# Patient Record
Sex: FEMALE | Race: WHITE | NOT HISPANIC OR LATINO
[De-identification: names, ages, dates, MRNs, and addresses within clinical notes are randomized per-mention and may not be internally consistent; named-entity substitution may affect disease eponyms.]

---

## 2018-12-31 ENCOUNTER — INBOUND DOCUMENT (OUTPATIENT)
Age: 78
End: 2018-12-31

## 2019-02-15 ENCOUNTER — RECORD ABSTRACTING (OUTPATIENT)
Age: 79
End: 2019-02-15

## 2019-02-15 DIAGNOSIS — D50.0 IRON DEFICIENCY ANEMIA SECONDARY TO BLOOD LOSS (CHRONIC): ICD-10-CM

## 2019-02-15 DIAGNOSIS — Z98.890 OTHER SPECIFIED POSTPROCEDURAL STATES: ICD-10-CM

## 2019-02-15 DIAGNOSIS — Z85.118 PERSONAL HISTORY OF OTHER MALIGNANT NEOPLASM OF BRONCHUS AND LUNG: ICD-10-CM

## 2019-02-15 DIAGNOSIS — N83.9 NONINFLAMMATORY DISORDER OF OVARY, FALLOPIAN TUBE AND BROAD LIGAMENT, UNSPECIFIED: ICD-10-CM

## 2019-02-15 DIAGNOSIS — K27.9 PEPTIC ULCER, SITE UNSPECIFIED, UNSPECIFIED AS ACUTE OR CHRONIC, W/OUT HEMORRHAGE OR PERFORATION: ICD-10-CM

## 2019-02-15 DIAGNOSIS — Z87.891 PERSONAL HISTORY OF NICOTINE DEPENDENCE: ICD-10-CM

## 2019-02-18 ENCOUNTER — APPOINTMENT (OUTPATIENT)
Dept: INTERNAL MEDICINE | Facility: CLINIC | Age: 79
End: 2019-02-18
Payer: MEDICARE

## 2019-02-18 VITALS
HEIGHT: 65 IN | SYSTOLIC BLOOD PRESSURE: 138 MMHG | WEIGHT: 128 LBS | DIASTOLIC BLOOD PRESSURE: 72 MMHG | BODY MASS INDEX: 21.33 KG/M2

## 2019-02-18 DIAGNOSIS — Z85.118 PERSONAL HISTORY OF OTHER MALIGNANT NEOPLASM OF BRONCHUS AND LUNG: ICD-10-CM

## 2019-02-18 DIAGNOSIS — F10.10 ALCOHOL ABUSE, UNCOMPLICATED: ICD-10-CM

## 2019-02-18 PROCEDURE — 99214 OFFICE O/P EST MOD 30 MIN: CPT | Mod: 25

## 2019-02-18 PROCEDURE — 36415 COLL VENOUS BLD VENIPUNCTURE: CPT

## 2019-02-18 RX ORDER — CLOPIDOGREL BISULFATE 75 MG/1
75 TABLET, FILM COATED ORAL
Refills: 0 | Status: DISCONTINUED | COMMUNITY
End: 2019-02-18

## 2019-02-18 RX ORDER — NUT.TX.IMPAIRED DIGESTIVE FXN 0.035-1/ML
LIQUID (ML) ORAL
Refills: 0 | Status: DISCONTINUED | COMMUNITY
End: 2019-02-18

## 2019-02-19 PROBLEM — F10.10 ALCOHOL ABUSE: Status: ACTIVE | Noted: 2019-02-19

## 2019-02-19 PROBLEM — Z85.118 HISTORY OF MALIGNANT NEOPLASM OF LUNG: Status: RESOLVED | Noted: 2019-02-18 | Resolved: 2019-02-19

## 2019-02-19 LAB
ALBUMIN SERPL ELPH-MCNC: 4.2 G/DL
ALP BLD-CCNC: 79 U/L
ALT SERPL-CCNC: 10 U/L
ANION GAP SERPL CALC-SCNC: 13 MMOL/L
AST SERPL-CCNC: 21 U/L
BASOPHILS # BLD AUTO: 0.04 K/UL
BASOPHILS NFR BLD AUTO: 0.5 %
BILIRUB SERPL-MCNC: 0.2 MG/DL
BUN SERPL-MCNC: 21 MG/DL
CALCIUM SERPL-MCNC: 9.9 MG/DL
CHLORIDE SERPL-SCNC: 100 MMOL/L
CHOLEST SERPL-MCNC: 187 MG/DL
CHOLEST/HDLC SERPL: 2.4 RATIO
CO2 SERPL-SCNC: 25 MMOL/L
CREAT SERPL-MCNC: 0.61 MG/DL
EOSINOPHIL # BLD AUTO: 0.11 K/UL
EOSINOPHIL NFR BLD AUTO: 1.4 %
FOLATE SERPL-MCNC: >20 NG/ML
GLUCOSE SERPL-MCNC: 106 MG/DL
HCT VFR BLD CALC: 41.7 %
HDLC SERPL-MCNC: 77 MG/DL
HGB BLD-MCNC: 13.4 G/DL
IMM GRANULOCYTES NFR BLD AUTO: 0.3 %
LDLC SERPL CALC-MCNC: 92 MG/DL
LYMPHOCYTES # BLD AUTO: 1.57 K/UL
LYMPHOCYTES NFR BLD AUTO: 20.2 %
MAN DIFF?: NORMAL
MCHC RBC-ENTMCNC: 32.1 GM/DL
MCHC RBC-ENTMCNC: 32.4 PG
MCV RBC AUTO: 101 FL
MONOCYTES # BLD AUTO: 0.61 K/UL
MONOCYTES NFR BLD AUTO: 7.9 %
NEUTROPHILS # BLD AUTO: 5.42 K/UL
NEUTROPHILS NFR BLD AUTO: 69.7 %
PLATELET # BLD AUTO: 373 K/UL
POTASSIUM SERPL-SCNC: 5 MMOL/L
PROT SERPL-MCNC: 7.5 G/DL
RBC # BLD: 4.13 M/UL
RBC # FLD: 13.3 %
SODIUM SERPL-SCNC: 138 MMOL/L
T4 FREE SERPL-MCNC: 2.1 NG/DL
TRIGL SERPL-MCNC: 88 MG/DL
TSH SERPL-ACNC: 2.27 UIU/ML
VIT B12 SERPL-MCNC: 796 PG/ML
WBC # FLD AUTO: 7.77 K/UL

## 2019-02-19 NOTE — REVIEW OF SYSTEMS
[Joint Stiffness] : joint stiffness [Anxiety] : anxiety [Negative] : Heme/Lymph [Suicidal] : not suicidal [FreeTextEntry9] : s/p left shoulder fx & surgery

## 2019-02-19 NOTE — PHYSICAL EXAM

## 2019-02-19 NOTE — PLAN
[FreeTextEntry1] : 80 y/o right handed female presents with daughter Deepa for f/u, s/p left shoulder Fx and surgical repair following fall Christmas Day. Using cane due to unsteadiness. Moving back to the area from [place]. Labs done today. Advised pt to finish PT for shoulder and we will then consider driving eval. Call next week for results

## 2019-02-19 NOTE — HISTORY OF PRESENT ILLNESS
[FreeTextEntry1] : Followup since fall resulting in fractured left shoulder Luis day [de-identified] : 79-year-old right-handed female with h/o alcohol abuse, who fell Luis Day at her son's house fracturing her left shoulder presents with her daughter Chiquita. Pt returned to [place] to live last year, was visiting for [place]. Surgery was performed by Dr. Moulton of [facility] orthopedics at [facility]. Since then it has been decided that she will permanently move from [place] to the area, signs least at [place] tomorrow, as she is alone in [place] since her  . She is attending physical therapy for her shoulder regularly, currently is using cane but states she is using it because her children want her to although she does say she sometimes feels unsteady. Pt wants to know when she can start driving again - advised she must pass driving eval with PT @ [facility] before being cleared by me. I will refer her when she is done with PT for her shoulder. Has history of alcohol abuse, GI bleed, stent\par Denies chest pain/sob

## 2019-02-20 LAB
25(OH)D3 SERPL-MCNC: 59.5 NG/ML
TTG IGA SER IA-ACNC: <1.2 U/ML
TTG IGA SER-ACNC: NEGATIVE
TTG IGG SER IA-ACNC: 1.4 U/ML
TTG IGG SER IA-ACNC: NEGATIVE

## 2019-03-08 DIAGNOSIS — Z13.820 ENCOUNTER FOR SCREENING FOR OSTEOPOROSIS: ICD-10-CM

## 2019-03-13 PROBLEM — Z13.820 ENCOUNTER FOR SCREENING FOR OSTEOPOROSIS: Status: ACTIVE | Noted: 2019-03-13

## 2019-03-15 ENCOUNTER — RESULT REVIEW (OUTPATIENT)
Age: 79
End: 2019-03-15

## 2019-04-10 ENCOUNTER — APPOINTMENT (OUTPATIENT)
Dept: CARDIOLOGY | Facility: CLINIC | Age: 79
End: 2019-04-10

## 2019-05-15 RX ORDER — LOSARTAN POTASSIUM 100 MG/1
TABLET, FILM COATED ORAL
Refills: 0 | Status: DISCONTINUED | COMMUNITY
End: 2019-05-15

## 2019-06-06 ENCOUNTER — RX RENEWAL (OUTPATIENT)
Age: 79
End: 2019-06-06

## 2019-07-24 ENCOUNTER — LABORATORY RESULT (OUTPATIENT)
Age: 79
End: 2019-07-24

## 2019-07-24 ENCOUNTER — APPOINTMENT (OUTPATIENT)
Dept: INTERNAL MEDICINE | Facility: CLINIC | Age: 79
End: 2019-07-24
Payer: MEDICARE

## 2019-07-24 VITALS
HEIGHT: 65 IN | SYSTOLIC BLOOD PRESSURE: 130 MMHG | WEIGHT: 134 LBS | BODY MASS INDEX: 22.33 KG/M2 | DIASTOLIC BLOOD PRESSURE: 76 MMHG

## 2019-07-24 DIAGNOSIS — J30.89 OTHER ALLERGIC RHINITIS: ICD-10-CM

## 2019-07-24 DIAGNOSIS — J30.2 OTHER ALLERGIC RHINITIS: ICD-10-CM

## 2019-07-24 PROCEDURE — 93000 ELECTROCARDIOGRAM COMPLETE: CPT

## 2019-07-24 PROCEDURE — 36415 COLL VENOUS BLD VENIPUNCTURE: CPT

## 2019-07-24 PROCEDURE — 99214 OFFICE O/P EST MOD 30 MIN: CPT | Mod: 25

## 2019-07-25 LAB
25(OH)D3 SERPL-MCNC: 76.8 NG/ML
ALBUMIN SERPL ELPH-MCNC: 4.7 G/DL
ALP BLD-CCNC: 72 U/L
ALT SERPL-CCNC: 17 U/L
ANION GAP SERPL CALC-SCNC: 14 MMOL/L
APPEARANCE: CLEAR
AST SERPL-CCNC: 22 U/L
BASOPHILS # BLD AUTO: 0.07 K/UL
BASOPHILS NFR BLD AUTO: 1.3 %
BILIRUB SERPL-MCNC: 0.6 MG/DL
BILIRUBIN URINE: NEGATIVE
BLOOD URINE: NEGATIVE
BUN SERPL-MCNC: 17 MG/DL
CALCIUM SERPL-MCNC: 10.4 MG/DL
CHLORIDE SERPL-SCNC: 101 MMOL/L
CHOLEST SERPL-MCNC: 254 MG/DL
CHOLEST/HDLC SERPL: 1.8 RATIO
CO2 SERPL-SCNC: 29 MMOL/L
COLOR: YELLOW
CREAT SERPL-MCNC: 0.8 MG/DL
EOSINOPHIL # BLD AUTO: 0.09 K/UL
EOSINOPHIL NFR BLD AUTO: 1.6 %
FOLATE SERPL-MCNC: >20 NG/ML
GLUCOSE QUALITATIVE U: NEGATIVE
GLUCOSE SERPL-MCNC: 142 MG/DL
HCT VFR BLD CALC: 43.6 %
HDLC SERPL-MCNC: 143 MG/DL
HGB BLD-MCNC: 13.7 G/DL
IMM GRANULOCYTES NFR BLD AUTO: 0.2 %
KETONES URINE: NEGATIVE
LDLC SERPL CALC-MCNC: 97 MG/DL
LEUKOCYTE ESTERASE URINE: NEGATIVE
LYMPHOCYTES # BLD AUTO: 1 K/UL
LYMPHOCYTES NFR BLD AUTO: 18.2 %
MAN DIFF?: NORMAL
MCHC RBC-ENTMCNC: 31.4 GM/DL
MCHC RBC-ENTMCNC: 34 PG
MCV RBC AUTO: 108.2 FL
MONOCYTES # BLD AUTO: 0.35 K/UL
MONOCYTES NFR BLD AUTO: 6.4 %
NEUTROPHILS # BLD AUTO: 3.96 K/UL
NEUTROPHILS NFR BLD AUTO: 72.3 %
NITRITE URINE: NEGATIVE
PH URINE: 5.5
PLATELET # BLD AUTO: 236 K/UL
POTASSIUM SERPL-SCNC: 4.7 MMOL/L
PROT SERPL-MCNC: 7.6 G/DL
PROTEIN URINE: NEGATIVE
RBC # BLD: 4.03 M/UL
RBC # FLD: 14.1 %
SODIUM SERPL-SCNC: 144 MMOL/L
SPECIFIC GRAVITY URINE: 1.02
T4 FREE SERPL-MCNC: 2.1 NG/DL
TRIGL SERPL-MCNC: 72 MG/DL
TSH SERPL-ACNC: 1.26 UIU/ML
UROBILINOGEN URINE: NORMAL
VIT B12 SERPL-MCNC: 582 PG/ML
WBC # FLD AUTO: 5.48 K/UL

## 2019-07-26 ENCOUNTER — NON-APPOINTMENT (OUTPATIENT)
Age: 79
End: 2019-07-26

## 2019-07-26 RX ORDER — FERROUS SULFATE 325(65) MG
325 TABLET ORAL
Refills: 0 | Status: ACTIVE | COMMUNITY

## 2019-07-26 NOTE — HISTORY OF PRESENT ILLNESS
[FreeTextEntry1] : Annual exam [de-identified] : 79-year-old female presents for annual exam, bone density done in March of this year shows osteoporosis at hip, osteopenia lumbar spine.\par \par Also complaining of constant runny nose, clear fluid.\par \par Feels well, appetite good, walking as regularly as possible. Denies chest pain shortness of breath palpitations dizziness\par \par Up to date with screenings

## 2019-07-26 NOTE — PLAN
[FreeTextEntry1] : 79-year-old female presents for annual exam. 3/19 bone density shows osteoporosis of hip, she will start Actonel weekly. She understands how to take it\par \par She is also complaining about a persistent runny nose, clear fluid. Prescription sent for Azelastine nasal spray\par She will contact medical provider in [place] to obtain records i.e. colonoscopy mammogram etc.\par Call next week to review labs

## 2019-07-26 NOTE — COUNSELING
[Healthy eating counseling provided] : healthy eating [Activity counseling provided] : activity [Good understanding] : Patient has a good understanding of disease, goals and obesity follow-up plan [Fall prevention counseling provided] : fall prevention

## 2019-07-26 NOTE — HEALTH RISK ASSESSMENT
[Good] : ~his/her~  mood as  good [No] : No [No falls in past year] : Patient reported no falls in the past year [0] : 2) Feeling down, depressed, or hopeless: Not at all (0) [Patient reported bone density results were abnormal] : Patient reported bone density results were abnormal [HIV test declined] : HIV test declined [Hepatitis C test declined] : Hepatitis C test declined [Patient reported mammogram was normal] : Patient reported mammogram was normal [Patient reported PAP Smear was normal] : Patient reported PAP Smear was normal [Patient reported colonoscopy was normal] : Patient reported colonoscopy was normal [] : No [[id number]] : 0 [PapSmearComments] : as per patient, records will be transferred from [place] [MammogramComments] : as per patient, records will be transferred from [place] [BoneDensityDate] : 03/2019 [ColonoscopyComments] : as per patient, records will be transferred from [place] [BoneDensityComments] : OSTEOPOROSIS

## 2019-07-26 NOTE — PHYSICAL EXAM
[Normal Female:] : bladder was normal on palpation [Normal] : normal gait, coordination grossly intact, no focal deficits [de-identified] : Deferred GYN, denies pain lumps discharge [de-identified] : No lymphadenopathy [FreeTextEntry1] : Deferred GYN [de-identified] : Denies excessive thirst, urination, fatigue [de-identified] : Alert and oriented x3, no thought disorder [de-identified] : No rash, skinl esion [de-identified] : Bilateral hand tremor

## 2019-09-13 ENCOUNTER — RX RENEWAL (OUTPATIENT)
Age: 79
End: 2019-09-13

## 2019-09-19 ENCOUNTER — RX RENEWAL (OUTPATIENT)
Age: 79
End: 2019-09-19

## 2019-10-22 ENCOUNTER — RX RENEWAL (OUTPATIENT)
Age: 79
End: 2019-10-22

## 2019-12-06 ENCOUNTER — RX RENEWAL (OUTPATIENT)
Age: 79
End: 2019-12-06

## 2020-03-27 ENCOUNTER — RX RENEWAL (OUTPATIENT)
Age: 80
End: 2020-03-27

## 2020-04-02 PROBLEM — K27.9 GASTRODUODENAL ULCER: Status: ACTIVE | Noted: 2019-02-15

## 2020-04-09 ENCOUNTER — RX RENEWAL (OUTPATIENT)
Age: 80
End: 2020-04-09

## 2020-06-04 ENCOUNTER — RX RENEWAL (OUTPATIENT)
Age: 80
End: 2020-06-04

## 2020-06-07 ENCOUNTER — RX RENEWAL (OUTPATIENT)
Age: 80
End: 2020-06-07

## 2020-07-01 ENCOUNTER — NON-APPOINTMENT (OUTPATIENT)
Age: 80
End: 2020-07-01

## 2020-07-01 ENCOUNTER — LABORATORY RESULT (OUTPATIENT)
Age: 80
End: 2020-07-01

## 2020-07-01 ENCOUNTER — APPOINTMENT (OUTPATIENT)
Dept: INTERNAL MEDICINE | Facility: CLINIC | Age: 80
End: 2020-07-01
Payer: MEDICARE

## 2020-07-01 VITALS
DIASTOLIC BLOOD PRESSURE: 90 MMHG | HEIGHT: 65 IN | WEIGHT: 136 LBS | TEMPERATURE: 99.1 F | SYSTOLIC BLOOD PRESSURE: 136 MMHG | BODY MASS INDEX: 22.66 KG/M2

## 2020-07-01 VITALS
TEMPERATURE: 99.1 F | DIASTOLIC BLOOD PRESSURE: 90 MMHG | HEIGHT: 65 IN | BODY MASS INDEX: 22.66 KG/M2 | SYSTOLIC BLOOD PRESSURE: 136 MMHG | WEIGHT: 136 LBS

## 2020-07-01 DIAGNOSIS — Z20.828 CONTACT WITH AND (SUSPECTED) EXPOSURE TO OTHER VIRAL COMMUNICABLE DISEASES: ICD-10-CM

## 2020-07-01 PROCEDURE — 93000 ELECTROCARDIOGRAM COMPLETE: CPT

## 2020-07-01 PROCEDURE — 99214 OFFICE O/P EST MOD 30 MIN: CPT | Mod: CS,25

## 2020-07-01 PROCEDURE — 36415 COLL VENOUS BLD VENIPUNCTURE: CPT | Mod: CS

## 2020-07-01 RX ORDER — LEVOTHYROXINE SODIUM 0.07 MG/1
75 TABLET ORAL
Qty: 90 | Refills: 1 | Status: DISCONTINUED | COMMUNITY
Start: 2019-03-08 | End: 2020-07-01

## 2020-07-01 NOTE — HISTORY OF PRESENT ILLNESS
[Coronary Artery Disease] : coronary artery disease [COPD] : COPD [No Adverse Anesthesia Reaction] : no adverse anesthesia reaction in self or family member [(Patient denies any chest pain, claudication, dyspnea on exertion, orthopnea, palpitations or syncope)] : Patient denies any chest pain, claudication, dyspnea on exertion, orthopnea, palpitations or syncope [Aortic Stenosis] : no aortic stenosis [Recent Myocardial Infarction] : no recent myocardial infarction [Atrial Fibrillation] : no atrial fibrillation [Sleep Apnea] : no sleep apnea [Implantable Device/Pacemaker] : no implantable device/pacemaker [Asthma] : no asthma [Smoker] : not a smoker [Chronic Anticoagulation] : no chronic anticoagulation [Diabetes] : no diabetes [Chronic Kidney Disease] : no chronic kidney disease [FreeTextEntry1] : Vitrectomy RIGHT Eye [FreeTextEntry2] : 7/23/20 [FreeTextEntry3] : Dr Kris Alberts [FreeTextEntry4] : 80-year-old female with macular degeneration, seeing black floaters that look like bugs, scheduled for RIGHT eye vitrectomy. Patient denies chest pain shortness of breath palpitations dizziness.She is currently free from infectious disease [FreeTextEntry5] : 1/17 - non Q-wave MI (minimal troponin elevation, likely due to "demand ischemia" in setting of severe influenza 2/17 - pneumonia, intubated, sepsis, 2nd non Q-wave MI, Echo-EF >55%, 4/17- PTCA     2013 - Lung Ca, Right upper lobe lobectomy

## 2020-07-01 NOTE — REVIEW OF SYSTEMS
[Redness] : redness [Vision Problems] : vision problems [Negative] : Heme/Lymph [Discharge] : no discharge [Itching] : no itching [Pain] : no pain

## 2020-07-01 NOTE — ASSESSMENT
[Patient Optimized for Surgery] : Patient optimized for surgery [No Further Testing Recommended] : no further testing recommended [As per surgery] : as per surgery [FreeTextEntry4] : MEDICALLY CLEARED FOR PROPOSED SURGERY; CARDIAC CLEARANCE AS PER DR LUIS HOUSE

## 2020-07-01 NOTE — PLAN
[FreeTextEntry1] : 80-year-old female scheduled for right eye vitrectomy due to macular degeneration. Denies chest pain shortness of breath palpitations dizziness. Patient currently free from infectious disease

## 2020-07-01 NOTE — PHYSICAL EXAM
[Normal] : no joint swelling, grossly normal strength/tone [Normal Female:] : bladder was normal on palpation [de-identified] : Deferred GYN; Denies pain, lumps and discharge [FreeTextEntry1] : Deferred GI/GYN [de-identified] : No lymphadenopathy [de-identified] : Denies excessive thirst, urination, fatigue [de-identified] : Essential tremor, gait dysfunction [de-identified] : No rash or skin lesion [de-identified] : Alert and Oriented x3.  Appropriate mood and affect

## 2020-07-02 ENCOUNTER — APPOINTMENT (OUTPATIENT)
Dept: CARDIOLOGY | Facility: CLINIC | Age: 80
End: 2020-07-02
Payer: MEDICARE

## 2020-07-02 VITALS
SYSTOLIC BLOOD PRESSURE: 128 MMHG | BODY MASS INDEX: 22.66 KG/M2 | DIASTOLIC BLOOD PRESSURE: 82 MMHG | HEIGHT: 65 IN | WEIGHT: 136 LBS

## 2020-07-02 DIAGNOSIS — Z86.79 PERSONAL HISTORY OF OTHER DISEASES OF THE CIRCULATORY SYSTEM: ICD-10-CM

## 2020-07-02 DIAGNOSIS — Z01.818 ENCOUNTER FOR OTHER PREPROCEDURAL EXAMINATION: ICD-10-CM

## 2020-07-02 LAB
ALBUMIN SERPL ELPH-MCNC: 4.6 G/DL
ALP BLD-CCNC: 79 U/L
ALT SERPL-CCNC: 12 U/L
ANION GAP SERPL CALC-SCNC: 14 MMOL/L
APTT BLD: 31.2 SEC
AST SERPL-CCNC: 20 U/L
BASOPHILS # BLD AUTO: 0.06 K/UL
BASOPHILS NFR BLD AUTO: 1.6 %
BILIRUB SERPL-MCNC: 0.6 MG/DL
BUN SERPL-MCNC: 16 MG/DL
CALCIUM SERPL-MCNC: 9.7 MG/DL
CHLORIDE SERPL-SCNC: 98 MMOL/L
CHOLEST SERPL-MCNC: 249 MG/DL
CHOLEST/HDLC SERPL: 2.1 RATIO
CO2 SERPL-SCNC: 28 MMOL/L
CREAT SERPL-MCNC: 0.82 MG/DL
EOSINOPHIL # BLD AUTO: 0.12 K/UL
EOSINOPHIL NFR BLD AUTO: 3.1 %
GLUCOSE SERPL-MCNC: 166 MG/DL
HCT VFR BLD CALC: 45.2 %
HDLC SERPL-MCNC: 116 MG/DL
HGB BLD-MCNC: 14.8 G/DL
IMM GRANULOCYTES NFR BLD AUTO: 0.3 %
INR PPP: 0.91 RATIO
LDLC SERPL CALC-MCNC: 115 MG/DL
LYMPHOCYTES # BLD AUTO: 1.19 K/UL
LYMPHOCYTES NFR BLD AUTO: 31.2 %
MAN DIFF?: NORMAL
MCHC RBC-ENTMCNC: 32.7 GM/DL
MCHC RBC-ENTMCNC: 32.7 PG
MCV RBC AUTO: 99.8 FL
MONOCYTES # BLD AUTO: 0.28 K/UL
MONOCYTES NFR BLD AUTO: 7.3 %
NEUTROPHILS # BLD AUTO: 2.16 K/UL
NEUTROPHILS NFR BLD AUTO: 56.5 %
PLATELET # BLD AUTO: 235 K/UL
POTASSIUM SERPL-SCNC: 5.1 MMOL/L
PROT SERPL-MCNC: 7.5 G/DL
PT BLD: 10.8 SEC
RBC # BLD: 4.53 M/UL
RBC # FLD: 13.6 %
SODIUM SERPL-SCNC: 140 MMOL/L
TRIGL SERPL-MCNC: 85 MG/DL
WBC # FLD AUTO: 3.82 K/UL

## 2020-07-02 PROCEDURE — 99214 OFFICE O/P EST MOD 30 MIN: CPT

## 2020-07-02 NOTE — HISTORY OF PRESENT ILLNESS
[FreeTextEntry1] : 79 yo woman, seen preop before right eye vitrectomy, with multiple medical issues, including:\par 1) Hx of lung cancer--\par 2) essential hypertension, on losartan and metoprolol tartrate 25\par 3) Hypothyroidism on levothyroxine\par 4) COPD\par 5) Hx of CAD\par \par The patient raised her family here in [facility], but then came back to the area when her  passed away. She lives locally and has grown children and his sister.\par \par In the past she was hospitalized for pneumonia and sepsis. She was found to have lung cancer and underwent successful lobectomy. The surgery was felt to be curative. In 2017 she had a non-Q-wave MI. 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 She is scheduled for right vitrectomy on July 23.\par \par The patient is asymptomatic.\par She does not experience angina at all. No shortness of breath or dyspnea on exertion.\par No history of congestive heart failure. No orthopnea or PND. No peripheral edema.\par She denies knowledge of a heart murmur or rheumatic fever.\par \par The patient smoked as a young adult but not in the last 40+ years\par There is a history of alcohol abuse.\par The patient is , lives at [place], and has adult children that live locally.\par \par \par Laboratory studies:  July 1, 20/20----yesterday\par ECG: Normal sinus rhythm and within normal limits\par Cholesterol 249, LDL 1:15, , triglycerides 85\par Comprehensive medical profile--normal\par Complete blood count--normal\par

## 2020-07-02 NOTE — ASSESSMENT
[FreeTextEntry1] : #1) known history of CAD with prior small non-STEMI, and subsequent cardiac catheterization revealing only branch disease of the OM. There was nonobstructive disease in the main circumflex, LAD and RCA. She is status post successful percutaneous revascularization with stenting. She is asymptomatic. No recent cardiovascular events. There were no contraindication to surgery. \par #2) history of essential hypertension--reasonably controlled on single agent, losartan.\par #3) murmur compatible with mild aortic stenosis. This is asymptomatic and on a clinical basis does not yet appear significant. However her baseline echo is indicated. This does not need to postpone surgery as it is simply obtaining a baseline study. Bone\par #4) history of lung cancer, status post resection. ?? Suspect curative\par #5) marked tremor, per neurology\par #6) history of substance abuse (alcohol).\par \par Meds: Medically stable for right vitrectomy. No contraindication to this procedure. This is a low-risk procedure. The patient is low to moderate risk. Only risk factor appears to be age and fragility. \par  no recent cardiovascular symptoms or events.\par \par Plan: Proceed with surgery\par          Electively obtain echocardiogram his baseline concerning her aortic stenosis which is presumptively mild.

## 2020-07-02 NOTE — PHYSICAL EXAM
[General Appearance - Well Developed] : well developed [Normal Appearance] : normal appearance [Well Groomed] : well groomed [General Appearance - Well Nourished] : well nourished [General Appearance - In No Acute Distress] : no acute distress [Normal Conjunctiva] : the conjunctiva exhibited no abnormalities [No Deformities] : no deformities [Eyelids - No Xanthelasma] : the eyelids demonstrated no xanthelasmas [No Oral Pallor] : no oral pallor [No Oral Cyanosis] : no oral cyanosis [Normal Oral Mucosa] : normal oral mucosa [Normal Jugular Venous V Waves Present] : normal jugular venous V waves present [Normal Jugular Venous A Waves Present] : normal jugular venous A waves present [No Jugular Venous Hernandez A Waves] : no jugular venous hernandez A waves [5th Left ICS - MCL] : palpated at the 5th LICS in the midclavicular line [Normal Rate] : normal [Normal] : normal [Normal S1] : normal S1 [Rhythm Regular] : regular [Normal S2] : normal S2 [No Abnormalities] : the abdominal aorta was not enlarged and no bruit was heard [Exaggerated Use Of Accessory Muscles For Inspiration] : no accessory muscle use [Respiration, Rhythm And Depth] : normal respiratory rhythm and effort [Abdomen Tenderness] : non-tender [Abdomen Soft] : soft [Auscultation Breath Sounds / Voice Sounds] : lungs were clear to auscultation bilaterally [Abnormal Walk] : normal gait [Abdomen Mass (___ Cm)] : no abdominal mass palpated [Nail Clubbing] : no clubbing of the fingernails [Gait - Sufficient For Exercise Testing] : the gait was sufficient for exercise testing [Cyanosis, Localized] : no localized cyanosis [Petechial Hemorrhages (___cm)] : no petechial hemorrhages [] : no rash [Skin Color & Pigmentation] : normal skin color and pigmentation [No Venous Stasis] : no venous stasis [No Skin Ulcers] : no skin ulcer [Skin Lesions] : no skin lesions [No Xanthoma] : no  xanthoma was observed [Oriented To Time, Place, And Person] : oriented to person, place, and time [Affect] : the affect was normal [Mood] : the mood was normal [No Anxiety] : not feeling anxious [II] : a grade 2 [Right Femoral Bruit] : no bruit heard over the right femoral artery [Left Carotid Bruit] : no bruit heard over the left carotid [Right Carotid Bruit] : no bruit heard over the right carotid [Left Femoral Bruit] : no bruit heard over the left femoral artery

## 2020-07-03 LAB
APPEARANCE: ABNORMAL
BILIRUBIN URINE: NEGATIVE
BLOOD URINE: NEGATIVE
COLOR: YELLOW
GLUCOSE QUALITATIVE U: NEGATIVE
KETONES URINE: NEGATIVE
LEUKOCYTE ESTERASE URINE: NEGATIVE
NITRITE URINE: NEGATIVE
PH URINE: 8
PROTEIN URINE: NEGATIVE
SARS-COV-2 IGG SERPL IA-ACNC: 0.01 INDEX
SARS-COV-2 IGG SERPL QL IA: NEGATIVE
SPECIFIC GRAVITY URINE: 1.02
UROBILINOGEN URINE: NORMAL

## 2020-07-06 LAB — BACTERIA UR CULT: ABNORMAL

## 2020-07-22 ENCOUNTER — RX RENEWAL (OUTPATIENT)
Age: 80
End: 2020-07-22

## 2020-09-01 ENCOUNTER — RX RENEWAL (OUTPATIENT)
Age: 80
End: 2020-09-01

## 2020-09-02 ENCOUNTER — RX RENEWAL (OUTPATIENT)
Age: 80
End: 2020-09-02

## 2020-09-04 ENCOUNTER — RX RENEWAL (OUTPATIENT)
Age: 80
End: 2020-09-04

## 2020-09-04 PROBLEM — I25.10 ATHEROSCLEROTIC HEART DISEASE OF NATIVE CORONARY ARTERY WITHOUT ANGINA PECTORIS: Status: ACTIVE | Noted: 2020-07-02

## 2020-09-10 ENCOUNTER — APPOINTMENT (OUTPATIENT)
Dept: CARDIOLOGY | Facility: CLINIC | Age: 80
End: 2020-09-10
Payer: MEDICARE

## 2020-09-10 ENCOUNTER — NON-APPOINTMENT (OUTPATIENT)
Age: 80
End: 2020-09-10

## 2020-09-10 VITALS
HEIGHT: 65 IN | SYSTOLIC BLOOD PRESSURE: 136 MMHG | TEMPERATURE: 97.9 F | DIASTOLIC BLOOD PRESSURE: 66 MMHG | WEIGHT: 145 LBS | BODY MASS INDEX: 24.16 KG/M2

## 2020-09-10 VITALS — HEART RATE: 52 BPM

## 2020-09-10 DIAGNOSIS — I25.10 ATHEROSCLEROTIC HEART DISEASE OF NATIVE CORONARY ARTERY W/OUT ANGINA PECTORIS: ICD-10-CM

## 2020-09-10 PROCEDURE — 93000 ELECTROCARDIOGRAM COMPLETE: CPT

## 2020-09-10 PROCEDURE — 99214 OFFICE O/P EST MOD 30 MIN: CPT

## 2020-09-10 NOTE — PHYSICAL EXAM
[General Appearance - Well Developed] : well developed [Normal Appearance] : normal appearance [Well Groomed] : well groomed [General Appearance - Well Nourished] : well nourished [No Deformities] : no deformities [General Appearance - In No Acute Distress] : no acute distress [Normal Conjunctiva] : the conjunctiva exhibited no abnormalities [No Oral Pallor] : no oral pallor [Eyelids - No Xanthelasma] : the eyelids demonstrated no xanthelasmas [Normal Oral Mucosa] : normal oral mucosa [Normal Jugular Venous A Waves Present] : normal jugular venous A waves present [No Oral Cyanosis] : no oral cyanosis [Normal Jugular Venous V Waves Present] : normal jugular venous V waves present [No Jugular Venous Hernandez A Waves] : no jugular venous hernandez A waves [Respiration, Rhythm And Depth] : normal respiratory rhythm and effort [Exaggerated Use Of Accessory Muscles For Inspiration] : no accessory muscle use [Auscultation Breath Sounds / Voice Sounds] : lungs were clear to auscultation bilaterally [Abdomen Soft] : soft [Abdomen Tenderness] : non-tender [Abdomen Mass (___ Cm)] : no abdominal mass palpated [Abnormal Walk] : normal gait [Gait - Sufficient For Exercise Testing] : the gait was sufficient for exercise testing [Nail Clubbing] : no clubbing of the fingernails [Petechial Hemorrhages (___cm)] : no petechial hemorrhages [Cyanosis, Localized] : no localized cyanosis [Skin Color & Pigmentation] : normal skin color and pigmentation [No Venous Stasis] : no venous stasis [] : no rash [Skin Lesions] : no skin lesions [No Xanthoma] : no  xanthoma was observed [No Skin Ulcers] : no skin ulcer [Oriented To Time, Place, And Person] : oriented to person, place, and time [Affect] : the affect was normal [No Anxiety] : not feeling anxious [Mood] : the mood was normal [Normal Rate] : normal [5th Left ICS - MCL] : palpated at the 5th LICS in the midclavicular line [Normal] : normal [Rhythm Regular] : regular [Normal S1] : normal S1 [II] : a grade 2 [Normal S2] : normal S2 [No Abnormalities] : the abdominal aorta was not enlarged and no bruit was heard [Left Carotid Bruit] : no bruit heard over the left carotid [Right Carotid Bruit] : no bruit heard over the right carotid [Right Femoral Bruit] : no bruit heard over the right femoral artery [Left Femoral Bruit] : no bruit heard over the left femoral artery

## 2020-09-10 NOTE — HISTORY OF PRESENT ILLNESS
[FreeTextEntry1] : 79 yo woman, seen preop before right eye vitrectomy, with multiple medical issues, including:\par 1) Hx of lung cancer--\par 2) essential hypertension, on losartan and metoprolol tartrate 25\par 3) Hypothyroidism on levothyroxine\par 4) COPD\par 5) Hx of CAD\par \par The patient raised her family here in [facility], but then came back to the area when her  passed away. She lives locally and has grown children and his sister.\par \par In the past she was hospitalized for pneumonia and sepsis. She was found to have lung cancer and underwent successful lobectomy. The surgery was felt to be curative. In 2017 she had a non-Q-wave MI. 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 She is scheduled for right vitrectomy on July 23.\par \par The patient is asymptomatic.\par She does not experience angina at all. No shortness of breath or dyspnea on exertion.\par No history of congestive heart failure. No orthopnea or PND. No peripheral edema.\par She denies knowledge of a heart murmur or rheumatic fever.\par \par The patient smoked as a young adult but not in the last 40+ years\par There is a history of alcohol abuse.\par The patient is , lives at [place], and has adult children that live locally.\par \par \par Laboratory studies:  July 1, 20/20----yesterday\par ECG: Normal sinus rhythm and within normal limits\par Cholesterol 249, LDL 1:15, , triglycerides 85\par Comprehensive medical profile--normal\par Complete blood count--normal\par

## 2020-09-10 NOTE — ASSESSMENT
[FreeTextEntry1] : #1) known history of CAD with prior small non--STEMI, and subsequent cardiac catheterization revealing only branch disease of the OM. There was nonobstructive disease in the main circumflex, LAD and RCA. She is status post successful percutaneous revascularization with stenting. She is asymptomatic. No recent cardiovascular events. There were no contraindication to surgery. \par #2) history of essential hypertension--reasonably controlled on metoprolol and losartan.\par #3) murmur compatible with mild aortic stenosis. This is asymptomatic and on a clinical basis does not yet appear significant. However her baseline echo is indicated. Will schedule\par #4) history of lung cancer, status post resection. ?? Suspect curative\par #5) marked tremor, per neurology\par #6) history of substance abuse (alcohol).\par \par Meds: Medically stable for right vitrectomy. No contraindication to this procedure. This is a low-risk procedure. The patient is low to moderate risk. Only risk factor appears to be age and fragility. \par  no recent cardiovascular symptoms or events.\par \par Plan: Proceed with surgery\par          Electively obtain echocardiogram his baseline

## 2020-10-20 ENCOUNTER — RESULT REVIEW (OUTPATIENT)
Age: 80
End: 2020-10-20

## 2020-11-05 ENCOUNTER — RX RENEWAL (OUTPATIENT)
Age: 80
End: 2020-11-05

## 2020-12-21 ENCOUNTER — RX RENEWAL (OUTPATIENT)
Age: 80
End: 2020-12-21

## 2020-12-28 ENCOUNTER — RX RENEWAL (OUTPATIENT)
Age: 80
End: 2020-12-28

## 2020-12-31 RX ORDER — LOSARTAN POTASSIUM 25 MG/1
25 TABLET, FILM COATED ORAL DAILY
Qty: 180 | Refills: 1 | Status: DISCONTINUED | COMMUNITY
Start: 2020-04-09 | End: 2020-12-31

## 2021-01-07 ENCOUNTER — APPOINTMENT (OUTPATIENT)
Dept: CARDIOLOGY | Facility: CLINIC | Age: 81
End: 2021-01-07
Payer: MEDICARE

## 2021-01-07 ENCOUNTER — NON-APPOINTMENT (OUTPATIENT)
Age: 81
End: 2021-01-07

## 2021-01-07 VITALS
BODY MASS INDEX: 23.99 KG/M2 | WEIGHT: 144 LBS | SYSTOLIC BLOOD PRESSURE: 164 MMHG | DIASTOLIC BLOOD PRESSURE: 70 MMHG | RESPIRATION RATE: 16 BRPM | HEIGHT: 65 IN

## 2021-01-07 PROCEDURE — 93000 ELECTROCARDIOGRAM COMPLETE: CPT

## 2021-01-07 PROCEDURE — 36415 COLL VENOUS BLD VENIPUNCTURE: CPT

## 2021-01-07 PROCEDURE — 99214 OFFICE O/P EST MOD 30 MIN: CPT

## 2021-01-07 NOTE — PHYSICAL EXAM
[General Appearance - Well Developed] : well developed [Normal Appearance] : normal appearance [Well Groomed] : well groomed [General Appearance - Well Nourished] : well nourished [No Deformities] : no deformities [General Appearance - In No Acute Distress] : no acute distress [Normal Conjunctiva] : the conjunctiva exhibited no abnormalities [Eyelids - No Xanthelasma] : the eyelids demonstrated no xanthelasmas [Normal Oral Mucosa] : normal oral mucosa [No Oral Pallor] : no oral pallor [No Oral Cyanosis] : no oral cyanosis [Normal Jugular Venous A Waves Present] : normal jugular venous A waves present [Normal Jugular Venous V Waves Present] : normal jugular venous V waves present [No Jugular Venous Hernandez A Waves] : no jugular venous hernandez A waves [Respiration, Rhythm And Depth] : normal respiratory rhythm and effort [Exaggerated Use Of Accessory Muscles For Inspiration] : no accessory muscle use [Auscultation Breath Sounds / Voice Sounds] : lungs were clear to auscultation bilaterally [Abdomen Soft] : soft [Abdomen Tenderness] : non-tender [Abdomen Mass (___ Cm)] : no abdominal mass palpated [Abnormal Walk] : normal gait [Gait - Sufficient For Exercise Testing] : the gait was sufficient for exercise testing [Nail Clubbing] : no clubbing of the fingernails [Cyanosis, Localized] : no localized cyanosis [Petechial Hemorrhages (___cm)] : no petechial hemorrhages [Skin Color & Pigmentation] : normal skin color and pigmentation [] : no rash [No Venous Stasis] : no venous stasis [Skin Lesions] : no skin lesions [No Skin Ulcers] : no skin ulcer [No Xanthoma] : no  xanthoma was observed [Oriented To Time, Place, And Person] : oriented to person, place, and time [Affect] : the affect was normal [Mood] : the mood was normal [No Anxiety] : not feeling anxious [5th Left ICS - MCL] : palpated at the 5th LICS in the midclavicular line [Normal] : normal [Normal Rate] : normal [Rhythm Regular] : regular [Normal S1] : normal S1 [Normal S2] : normal S2 [II] : a grade 2 [No Abnormalities] : the abdominal aorta was not enlarged and no bruit was heard [Right Carotid Bruit] : no bruit heard over the right carotid [Left Carotid Bruit] : no bruit heard over the left carotid [Right Femoral Bruit] : no bruit heard over the right femoral artery [Left Femoral Bruit] : no bruit heard over the left femoral artery

## 2021-01-07 NOTE — REASON FOR VISIT
[FreeTextEntry1] : Post op Bilateral eye Vitrectomy\par Known hx of CAD--s/p OM stent\par COPD\par Hypertension--on metoprolol and losartan\par Hyperlipidemia--on atorvastatin

## 2021-01-07 NOTE — ASSESSMENT
[FreeTextEntry1] : #1) known history of CAD with prior small non--STEMI, and subsequent cardiac catheterization revealing only branch disease of the OM. There was nonobstructive disease in the main circumflex, LAD and RCA. She is status post successful percutaneous revascularization with stenting. She is asymptomatic. No recent cardiovascular events. There were no contraindication to surgery. \par #2) history of essential hypertension--reasonably controlled on metoprolol and losartan.\par #3) murmur and echo are compatible with mild to moderate aortic stenosis. This is asymptomatic. There is no indication for intervention in an  asymptomatic pt with  mild to moderate aortic stenosis. She will require periodic followup studies.\par --There is also moderate aortic insufficiency which is asymptomatic and associated with normal LV dimensions. No intervention is required here as well.\par #4) history of lung cancer, status post resection. ?? Suspect curative\par #5) marked tremor, per neurology\par #6) history of substance abuse (alcohol).\par \par

## 2021-01-07 NOTE — HISTORY OF PRESENT ILLNESS
[FreeTextEntry1] : 82 yo woman, seen after right eye vitrectomy, with multiple medical issues, including:\par 1) Hx of lung cancer--\par 2) essential hypertension, on losartan and metoprolol tartrate 25\par 3) Hypothyroidism on levothyroxine\par 4) COPD\par 5) Hx of CAD\par \par The patient raised her family here in [facility], but then came back to the area when her  passed away. She lives locally and has grown children and his sister.\par \par -In the past she was hospitalized for pneumonia and sepsis. She was found to have lung cancer and underwent successful lobectomy. The surgery was felt to be curative. \par -In 2017 she had a non-Q-wave MI. 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 She is s/p  right vitrectomy on July 23, 2020.\par \par The patient is asymptomatic.\par She does not experience angina at all. No shortness of breath or dyspnea on exertion.\par No history of congestive heart failure. No orthopnea or PND. No peripheral edema.\par She denies knowledge of a heart murmur or rheumatic fever.\par \par The patient smoked as a young adult but not in the last 40+ years\par There is a history of alcohol abuse.\par The patient is , lives at [place], and has adult children that live locally.\par \par \par Laboratory studies:  July 1, 20/20----yesterday\par ECG: Normal sinus rhythm and within normal limits\par Cholesterol 249, LDL 1:15, , triglycerides 85\par Comprehensive medical profile--normal\par Complete blood count--normal\par \par Echocardiogram (October 21, 2020):\par Normal LV size and contractility. EF 60%.\par Grade 1 diastolic dysfunction.\par Mild pulmonary hypertension with RVSP  46.1\par Mild left atrial enlargement. \par ***Moderate aortic valve calcification\par --With mild to moderate aortic stenosis(36.6 /18.6 mean and aortic valve area 0.93 cm square)\par Mitral valve thickening\par

## 2021-01-08 LAB
ALBUMIN SERPL ELPH-MCNC: 4.7 G/DL
ALP BLD-CCNC: 68 U/L
ALT SERPL-CCNC: 16 U/L
ANION GAP SERPL CALC-SCNC: 11 MMOL/L
AST SERPL-CCNC: 20 U/L
BASOPHILS # BLD AUTO: 0.08 K/UL
BASOPHILS NFR BLD AUTO: 1.6 %
BILIRUB SERPL-MCNC: 0.4 MG/DL
BUN SERPL-MCNC: 19 MG/DL
CALCIUM SERPL-MCNC: 9.7 MG/DL
CHLORIDE SERPL-SCNC: 103 MMOL/L
CHOLEST SERPL-MCNC: 280 MG/DL
CO2 SERPL-SCNC: 29 MMOL/L
CREAT SERPL-MCNC: 0.9 MG/DL
EOSINOPHIL # BLD AUTO: 0.1 K/UL
EOSINOPHIL NFR BLD AUTO: 2 %
GLUCOSE SERPL-MCNC: 125 MG/DL
HCT VFR BLD CALC: 42.7 %
HDLC SERPL-MCNC: 133 MG/DL
HGB BLD-MCNC: 14 G/DL
IMM GRANULOCYTES NFR BLD AUTO: 0.4 %
LDLC SERPL CALC-MCNC: 133 MG/DL
LYMPHOCYTES # BLD AUTO: 1.44 K/UL
LYMPHOCYTES NFR BLD AUTO: 28.9 %
MAN DIFF?: NORMAL
MCHC RBC-ENTMCNC: 32.8 GM/DL
MCHC RBC-ENTMCNC: 34.2 PG
MCV RBC AUTO: 104.4 FL
MONOCYTES # BLD AUTO: 0.38 K/UL
MONOCYTES NFR BLD AUTO: 7.6 %
NEUTROPHILS # BLD AUTO: 2.97 K/UL
NEUTROPHILS NFR BLD AUTO: 59.5 %
NONHDLC SERPL-MCNC: 147 MG/DL
PLATELET # BLD AUTO: 217 K/UL
POTASSIUM SERPL-SCNC: 5 MMOL/L
PROT SERPL-MCNC: 7.9 G/DL
RBC # BLD: 4.09 M/UL
RBC # FLD: 14.1 %
SODIUM SERPL-SCNC: 143 MMOL/L
TRIGL SERPL-MCNC: 68 MG/DL
WBC # FLD AUTO: 4.99 K/UL

## 2021-01-26 ENCOUNTER — APPOINTMENT (OUTPATIENT)
Dept: NEUROLOGY | Facility: CLINIC | Age: 81
End: 2021-01-26
Payer: MEDICARE

## 2021-01-26 VITALS
HEART RATE: 96 BPM | SYSTOLIC BLOOD PRESSURE: 103 MMHG | DIASTOLIC BLOOD PRESSURE: 65 MMHG | BODY MASS INDEX: 24.24 KG/M2 | TEMPERATURE: 97.4 F | HEIGHT: 64 IN | WEIGHT: 142 LBS

## 2021-01-26 PROCEDURE — 99204 OFFICE O/P NEW MOD 45 MIN: CPT

## 2021-01-26 RX ORDER — FEXOFENADINE HCL 60 MG
TABLET ORAL
Refills: 0 | Status: DISCONTINUED | COMMUNITY
End: 2021-01-26

## 2021-01-26 RX ORDER — METOPROLOL TARTRATE 25 MG/1
25 TABLET, FILM COATED ORAL TWICE DAILY
Qty: 180 | Refills: 2 | Status: DISCONTINUED | COMMUNITY
Start: 2019-05-15 | End: 2021-01-26

## 2021-01-28 NOTE — PHYSICAL EXAM
[FreeTextEntry1] : Physical examination \par General: No acute distress, Awake, Alert.   \par other: head tremor.\par \par Mental status \par Awake, alert, gives detailed history.\par Delayed recall 3/3.\par \par Cranial Nerves \par II: VFF  \par III, IV, VI: PERRL, EOMI.   \par V: Facial sensation is normal B/L.   \par VII: Facial strength is normal B/L. \par \par \par VIII: Gross hearing is intact.   \par \par IX, X: Palate is midline and elevates symmetrically.   \par XI: Trapezius normal strength.   \par XII: Tongue midline without atrophy or fasciculations. \par \par Motor exam  \par Muscle tone - cogwheel arms.  \par No atrophy or fasciculations \par Muscle Strength: arms and legs, proximal and distal flexors and extensors are normal \par \par No UE drift.\par Tremor - left greater than right - moderate frequency tremor with action and posture.\par She also has a low frequency rest tremor which increases with walking. \par \par Reflexes \par All present, normal, and symmetrical.   \par \par Plantars right: mute.   \par Plantars left: mute.   \par \par Coordination \par right slower than left ELY.\par Slow, no ataxia - FNF, ELY, HKS. \par \par Sensory \par Intact sensation to vibration and cold.\par \par \par Gait \par Flexed posture. \par \par

## 2021-01-28 NOTE — HISTORY OF PRESENT ILLNESS
[FreeTextEntry1] : Sharon Teresa is an 81 year old left handed woman with a history of NSTEMI, CAD, essential tremor presenting for a consultation for her tremor.\par \par She endorses having a tremor for over 15 years that has worsened recently.  She has been on Primidone 50mg daily for the past five years prescribed by a neurologist in [place].  Ms. Teresa reports it occasionally helps the tremor. The tremor was in the right hand originally but now it is bilateral. She has not been on any other medication for tremor. Her last bone density scan was in 2019.  The tremor originally was not interfering with her activity but now it is interfering. She currently lives by herself.\par \par Family history of tremor includes her 8 year old grandson and her brother had minimal tremor.\par \par The remaining neurological review of systems is negative.

## 2021-01-28 NOTE — ASSESSMENT
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 \par \par Discussed with cardiologist changing Metoprolol to Propranolol - he agrees. Trial of Propranolol LA 60mg. Follow up with cardiology.\par \par I recommend repeat bone density with PCP. Pt at increased risk of osteoporosis with Primidone. Continue calcium and vitamin D.\par \par Second opinion with movement disorder specialist Dr. Airam Burnham.

## 2021-01-28 NOTE — CONSULT LETTER
[Dear  ___] : Dear ~DIONISIO, [Consult Letter:] : I had the pleasure of evaluating your patient, [unfilled]. [Please see my note below.] : Please see my note below. [Consult Closing:] : Thank you very much for allowing me to participate in the care of this patient.  If you have any questions, please do not hesitate to contact me. [Sincerely,] : Sincerely, [FreeTextEntry3] : Jody Lam M.D.\par

## 2021-02-08 ENCOUNTER — RX RENEWAL (OUTPATIENT)
Age: 81
End: 2021-02-08

## 2021-02-11 NOTE — HISTORY OF PRESENT ILLNESS
[FreeTextEntry1] : 82 yo woman, seen after right eye vitrectomy, with multiple medical issues, including:\par 1) Hx of lung cancer--\par 2) essential hypertension, on losartan and metoprolol tartrate 25\par 3) Hypothyroidism on levothyroxine\par 4) COPD\par 5) Hx of CAD\par \par The patient raised her family here in [facility], but then came back to the area when her  passed away. She lives locally and has grown children and his sister.\par \par -In the past she was hospitalized for pneumonia and sepsis. She was found to have lung cancer and underwent successful lobectomy. The surgery was felt to be curative. \par -In 2017 she had a non-Q-wave MI. 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 She is s/p  right vitrectomy on July 23, 2020.\par \par The patient is asymptomatic.\par She does not experience angina at all. No shortness of breath or dyspnea on exertion.\par No history of congestive heart failure. No orthopnea or PND. No peripheral edema.\par She denies knowledge of a heart murmur or rheumatic fever.\par \par The patient smoked as a young adult but not in the last 40+ years\par There is a history of alcohol abuse.\par The patient is , lives at [place], and has adult children that live locally.\par \par \par Laboratory studies:  July 1, 20/20----yesterday\par ECG: Normal sinus rhythm and within normal limits\par Cholesterol 249, LDL 1:15, , triglycerides 85\par Comprehensive medical profile--normal\par Complete blood count--normal\par \par Echocardiogram (October 21, 2020):\par Normal LV size and contractility. EF 60%.\par Grade 1 diastolic dysfunction.\par Mild pulmonary hypertension with RVSP  46.1\par Mild left atrial enlargement. \par ***Moderate aortic valve calcification\par --With mild to moderate aortic stenosis(36.6 /18.6 mean and aortic valve area 0.93 cm square)\par Mitral valve thickening\par

## 2021-02-12 ENCOUNTER — RX RENEWAL (OUTPATIENT)
Age: 81
End: 2021-02-12

## 2021-02-16 ENCOUNTER — RX RENEWAL (OUTPATIENT)
Age: 81
End: 2021-02-16

## 2021-02-18 ENCOUNTER — APPOINTMENT (OUTPATIENT)
Dept: CARDIOLOGY | Facility: CLINIC | Age: 81
End: 2021-02-18

## 2021-03-04 PROBLEM — E78.2 MIXED HYPERLIPIDEMIA: Status: ACTIVE | Noted: 2019-02-15

## 2021-03-11 ENCOUNTER — NON-APPOINTMENT (OUTPATIENT)
Age: 81
End: 2021-03-11

## 2021-03-11 ENCOUNTER — LABORATORY RESULT (OUTPATIENT)
Age: 81
End: 2021-03-11

## 2021-03-11 ENCOUNTER — APPOINTMENT (OUTPATIENT)
Dept: CARDIOLOGY | Facility: CLINIC | Age: 81
End: 2021-03-11
Payer: MEDICARE

## 2021-03-11 VITALS
DIASTOLIC BLOOD PRESSURE: 68 MMHG | HEIGHT: 64 IN | SYSTOLIC BLOOD PRESSURE: 100 MMHG | BODY MASS INDEX: 23.9 KG/M2 | WEIGHT: 140 LBS

## 2021-03-11 DIAGNOSIS — E78.2 MIXED HYPERLIPIDEMIA: ICD-10-CM

## 2021-03-11 PROCEDURE — 93000 ELECTROCARDIOGRAM COMPLETE: CPT

## 2021-03-11 PROCEDURE — 99214 OFFICE O/P EST MOD 30 MIN: CPT

## 2021-03-11 PROCEDURE — 36415 COLL VENOUS BLD VENIPUNCTURE: CPT

## 2021-03-11 NOTE — REASON FOR VISIT
[FreeTextEntry1] : \par Known hx of CAD--s/p OM stent--on ASA\par COPD\par Hypertension--on propranolol and losartan\par Hyperlipidemia--on atorvastatin\par Treated hypothyroidism--on levothyroxine\par Tremor, on Propranolol\par Aortic stenosis--mild to moderate

## 2021-03-11 NOTE — ASSESSMENT
[FreeTextEntry1] : #1) known history of CAD with prior small non--STEMI, and subsequent cardiac catheterization revealing only branch disease of the OM. There was nonobstructive disease in the main circumflex, LAD and RCA. She is status post successful percutaneous revascularization with stenting. She is asymptomatic. No recent cardiovascular events. There were no contraindication to surgery. \par #2) history of essential hypertension--reasonably controlled on metoprolol and losartan.\par #3) murmur and echo are compatible with mild to moderate aortic stenosis. This is asymptomatic. There is no indication for intervention in an  asymptomatic pt with  mild to moderate aortic stenosis. She will require periodic followup studies.\par --There is also moderate aortic insufficiency which is asymptomatic and associated with normal LV dimensions. No intervention is required here as well.\par #4) history of lung cancer, status post resection. ?? Suspect curative\par #5) marked tremor, per neurology\par #6) history of substance abuse (alcohol).\par #7) Hyperlipidemia\par #8) Treated hypothyroidism--euthyroid\par \par

## 2021-03-11 NOTE — HISTORY OF PRESENT ILLNESS
[FreeTextEntry1] : 82 yo woman, seen after right eye vitrectomy, with multiple medical issues, including:\par 1) Hx of lung cancer--\par 2) essential hypertension, on losartan and metoprolol tartrate 25\par 3) Hypothyroidism on levothyroxine\par 4) COPD\par 5) Hx of CAD\par \par The patient raised her family here in [facility], but then came back to the area when her  passed away. She lives locally and has grown children and his sister.\par \par -In the past she was hospitalized for pneumonia and sepsis. She was found to have lung cancer and underwent successful lobectomy. The surgery was felt to be curative. \par -In 2017 she had a non-Q-wave MI. 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 She is s/p  right vitrectomy on July 23, 2020.\par \par The patient is asymptomatic.\par She does not experience angina at all. No shortness of breath or dyspnea on exertion.\par No history of congestive heart failure. No orthopnea or PND. No peripheral edema.\par She denies knowledge of a heart murmur or rheumatic fever.\par \par The patient smoked as a young adult but not in the last 40+ years\par There is a history of alcohol abuse.\par The patient is , lives at [place], and has adult children that live locally.\par \par Echocardiogram (October 21, 2020):\par Normal LV size and contractility. EF 60%.\par Grade 1 diastolic dysfunction.\par Mild pulmonary hypertension with RVSP  46.1\par Mild left atrial enlargement. \par ***Moderate aortic valve calcification\par --With mild to moderate aortic stenosis(36.6 /18.6 mean and aortic valve area 0.93 cm square)\par Mitral valve thickening\par \par Had her two Covid shots

## 2021-03-15 LAB
ALBUMIN SERPL ELPH-MCNC: 4.3 G/DL
ALP BLD-CCNC: 61 U/L
ALT SERPL-CCNC: 10 U/L
ANION GAP SERPL CALC-SCNC: 21 MMOL/L
AST SERPL-CCNC: 21 U/L
BASOPHILS # BLD AUTO: 0.04 K/UL
BASOPHILS NFR BLD AUTO: 0.8 %
BILIRUB SERPL-MCNC: 0.4 MG/DL
BUN SERPL-MCNC: 25 MG/DL
CALCIUM SERPL-MCNC: 10.4 MG/DL
CHLORIDE SERPL-SCNC: 101 MMOL/L
CHOLEST SERPL-MCNC: 213 MG/DL
CO2 SERPL-SCNC: 22 MMOL/L
CREAT SERPL-MCNC: 0.74 MG/DL
EOSINOPHIL # BLD AUTO: 0.09 K/UL
EOSINOPHIL NFR BLD AUTO: 1.8 %
GLUCOSE SERPL-MCNC: 109 MG/DL
HCT VFR BLD CALC: 44.6 %
HDLC SERPL-MCNC: 104 MG/DL
HGB BLD-MCNC: 14.6 G/DL
IMM GRANULOCYTES NFR BLD AUTO: 0.2 %
LDLC SERPL CALC-MCNC: 81 MG/DL
LYMPHOCYTES # BLD AUTO: 1.6 K/UL
LYMPHOCYTES NFR BLD AUTO: 31.8 %
MAN DIFF?: NORMAL
MCHC RBC-ENTMCNC: 32.7 GM/DL
MCHC RBC-ENTMCNC: 34.7 PG
MCV RBC AUTO: 105.9 FL
MONOCYTES # BLD AUTO: 0.44 K/UL
MONOCYTES NFR BLD AUTO: 8.7 %
NEUTROPHILS # BLD AUTO: 2.85 K/UL
NEUTROPHILS NFR BLD AUTO: 56.7 %
NONHDLC SERPL-MCNC: 109 MG/DL
PLATELET # BLD AUTO: 178 K/UL
POTASSIUM SERPL-SCNC: 4.4 MMOL/L
PROT SERPL-MCNC: 7.3 G/DL
RBC # BLD: 4.21 M/UL
RBC # FLD: 12.7 %
SODIUM SERPL-SCNC: 144 MMOL/L
TRIGL SERPL-MCNC: 140 MG/DL
WBC # FLD AUTO: 5.03 K/UL

## 2021-04-07 ENCOUNTER — APPOINTMENT (OUTPATIENT)
Dept: NEUROLOGY | Facility: CLINIC | Age: 81
End: 2021-04-07
Payer: MEDICARE

## 2021-04-07 VITALS
BODY MASS INDEX: 22.94 KG/M2 | TEMPERATURE: 97 F | HEART RATE: 75 BPM | HEIGHT: 64.5 IN | SYSTOLIC BLOOD PRESSURE: 147 MMHG | DIASTOLIC BLOOD PRESSURE: 77 MMHG | WEIGHT: 136 LBS

## 2021-04-07 PROCEDURE — 99215 OFFICE O/P EST HI 40 MIN: CPT

## 2021-04-07 RX ORDER — PRIMIDONE 50 MG/1
50 TABLET ORAL
Qty: 90 | Refills: 2 | Status: DISCONTINUED | COMMUNITY
Start: 2019-03-08 | End: 2021-04-07

## 2021-04-12 NOTE — PHYSICAL EXAM
[Person] : oriented to person [Place] : oriented to place [Time] : oriented to time [Concentration Intact] : normal concentrating ability [Comprehension] : comprehension intact [Cranial Nerves Optic (II)] : visual acuity intact bilaterally,  visual fields full to confrontation, pupils equal round and reactive to light [Cranial Nerves Trigeminal (V)] : facial sensation intact symmetrically [Cranial Nerves Oculomotor (III)] : extraocular motion intact [Cranial Nerves Facial (VII)] : face symmetrical [Cranial Nerves Vestibulocochlear (VIII)] : hearing was intact bilaterally [Cranial Nerves Glossopharyngeal (IX)] : tongue and palate midline [Cranial Nerves Accessory (XI - Cranial And Spinal)] : head turning and shoulder shrug symmetric [Cranial Nerves Hypoglossal (XII)] : there was no tongue deviation with protrusion [Motor Strength] : muscle strength was normal in all four extremities [Paresis Pronator Drift Right-Sided] : no pronator drift on the right [Paresis Pronator Drift Left-Sided] : no pronator drift on the left [Sensation Tactile Decrease] : light touch was intact [Coordination - Dysmetria Impaired Finger-to-Nose Bilateral] : not present [1+] : Ankle jerk left 1+ [FreeTextEntry1] : Face is masked with decreased blinking rate. EOMI, normal saccades. Voice is mildly hypophonic.\par There are tremors of the hands, with rest and posture, left more than right. No leg tremors. Mild head tremor.\par Bradykinesia is present bilaterally with finger tapping, rapid alternating and sequential movements, right more than left.\par Mild cogwheel rigidity on the right with activation. \par No trouble standing with arms crossed. Posture is mildly stooped. \par Gait is with good stride length and speed, no shuffling, no trouble with turns. Re-emergent resting left hand tremor while walking. Absent left arm swing. \par Postural reflexes are intact.

## 2021-04-12 NOTE — DISCUSSION/SUMMARY
[FreeTextEntry1] : Sharon Teresa is an 81 year old F LH with a PMHx of lung cancer, s/p MI, s/p lung resection on the right ~6 years ago, COPD, HLD, Depression, Hypothyroidism, HTN who presents for initial evaluation in the Movement Disorders Clinic at [facility]. \par \par The patient's history is consistent with Essential tremor, likely Essential tremor plus with mild parkinsonian features on examination. The patient reports that she has had a good response to propranolol with regard to her tremors and is interested in an adjustment.\par \par Plan:\par - Increase Propranolol from 60mg to 80mg ER daily for better tremor control. Monitor for side effects.\par - Will continue to monitor parkinsonian findings and determine need for treatment\par - Encouraged to increase exercise and physical activity and maintain an active social and intellectual life.\par \par RTC 3 months\par \par All questions were answered to her satisfaction. I spent 60 minutes with this patient.

## 2021-04-12 NOTE — HISTORY OF PRESENT ILLNESS
[FreeTextEntry1] : Sharon Teresa is an 81 year old F LH with a PMHx of lung cancer, s/p MI, s/p lung resection on the right ~6 years ago, COPD, HLD, Depression, Hypothyroidism, HTN who presents for initial evaluation in the Movement Disorders Clinic at [facility]. \par \par She has had a left hand tremor for years. Her medication was changed to propranolol about a month ago, it has helped. Less tremor in the right hand. The tremors interfere with writing. No tremors in the head. \par \par Buttons are challenging. No trouble tying shoelaces or cutting food. No trouble turning or adjusting in bed at night. She walks with a cane. She is afraid of falling. She shuffles when walking. She has fallen in the past, last was 1.5 years ago. Once she fell stepping off a curb and broke her shoulder on the left. She fell out of bed when she was living in [place].\par \par No changes in sense of smell.\par Writing is bigger.\par Her children think she speaks louder.\par No trouble swallowing.\par No constipation, she has a daily bowel movement.\par No trouble with urination.\par She has a history of acting out dreams and she remembers her dreams. She sleeps well otherwise.\par No history of hallucinations. \par No dizziness when standing.\par \par Family history: her mother used to shuffle, no one else with tremors\par Social history: lives alone, minimal exercise for balance and leg strengthening

## 2021-04-15 ENCOUNTER — APPOINTMENT (OUTPATIENT)
Dept: GASTROENTEROLOGY | Facility: CLINIC | Age: 81
End: 2021-04-15

## 2021-04-15 ENCOUNTER — APPOINTMENT (OUTPATIENT)
Dept: CARDIOLOGY | Facility: CLINIC | Age: 81
End: 2021-04-15
Payer: MEDICARE

## 2021-04-15 VITALS
BODY MASS INDEX: 22.94 KG/M2 | WEIGHT: 136 LBS | DIASTOLIC BLOOD PRESSURE: 78 MMHG | SYSTOLIC BLOOD PRESSURE: 144 MMHG | HEIGHT: 64.5 IN

## 2021-04-15 DIAGNOSIS — I25.10 ATHEROSCLEROTIC HEART DISEASE OF NATIVE CORONARY ARTERY W/OUT ANGINA PECTORIS: ICD-10-CM

## 2021-04-15 PROCEDURE — 99213 OFFICE O/P EST LOW 20 MIN: CPT

## 2021-04-15 NOTE — ASSESSMENT
[FreeTextEntry1] : #1) known history of CAD with prior small non--STEMI, and subsequent cardiac catheterization revealing only branch disease of the OM. There was nonobstructive disease in the main circumflex, LAD and RCA. She is status post successful percutaneous revascularization with stenting. She is asymptomatic. No recent cardiovascular events. There were no contraindication to surgery. \par #2) history of essential hypertension--reasonably controlled on propranolol and losartan.\par #3) murmur and echo are compatible with mild to moderate aortic stenosis. This is asymptomatic. There is no indication for intervention in an  asymptomatic pt with  mild to moderate aortic stenosis. She will require periodic followup studies.\par --There is also moderate aortic insufficiency which is asymptomatic and associated with normal LV dimensions. No intervention is required here as well.\par #4) history of lung cancer, status post resection. ?? Suspect curative\par #5) marked tremor, per neurology\par #6) history of substance abuse (alcohol).\par #7) Hyperlipidemia\par #8) Treated hypothyroidism--euthyroid\par \par

## 2021-04-15 NOTE — HISTORY OF PRESENT ILLNESS
[FreeTextEntry1] : 82 yo woman, seen after right eye vitrectomy, with multiple medical issues, including:\par 1) Hx of lung cancer--\par 2) essential hypertension, on losartan and metoprolol tartrate 25\par 3) Hypothyroidism on levothyroxine\par 4) COPD\par 5) Hx of CAD\par \par The patient raised her family here in [facility], but then came back to the area when her  passed away. She lives locally and has grown children and his sister.\par \par -In the past she was hospitalized for pneumonia and sepsis. She was found to have lung cancer and underwent successful lobectomy. The surgery was felt to be curative. \par -In 2017 she had a non-Q-wave MI. 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 She is s/p  right vitrectomy on July 23, 2020.\par \par The patient is asymptomatic.\par She does not experience angina at all. No shortness of breath or dyspnea on exertion.\par No history of congestive heart failure. No orthopnea or PND. No peripheral edema.\par She denies knowledge of a heart murmur or rheumatic fever.\par \par The patient smoked as a young adult but not in the last 40+ years\par There is a history of alcohol abuse.\par The patient is , lives at [place], and has adult children that live locally.\par ====================================================================================\par **(*Echocardiogram (October 21, 2020):\par Normal LV size and contractility. EF 60%.\par Grade 1 diastolic dysfunction.\par Mild pulmonary hypertension with RVSP  46.1\par Mild left atrial enlargement. \par ***Moderate aortic valve calcification\par --With mild to moderate aortic stenosis(36.6 /18.6 mean and aortic valve area 0.93 cm square)\par Mitral valve thickening\par ====================================================================================\par Had her two Covid shots and her neurologist increased propranolol LA from 60 to 80 mg daily

## 2021-04-21 ENCOUNTER — RX RENEWAL (OUTPATIENT)
Age: 81
End: 2021-04-21

## 2021-05-05 ENCOUNTER — RX RENEWAL (OUTPATIENT)
Age: 81
End: 2021-05-05

## 2021-06-21 ENCOUNTER — RESULT REVIEW (OUTPATIENT)
Age: 81
End: 2021-06-21

## 2021-06-21 ENCOUNTER — APPOINTMENT (OUTPATIENT)
Dept: INTERNAL MEDICINE | Facility: CLINIC | Age: 81
End: 2021-06-21
Payer: MEDICARE

## 2021-06-21 VITALS
HEIGHT: 64.5 IN | TEMPERATURE: 98.5 F | WEIGHT: 139 LBS | DIASTOLIC BLOOD PRESSURE: 70 MMHG | SYSTOLIC BLOOD PRESSURE: 170 MMHG | OXYGEN SATURATION: 92 % | BODY MASS INDEX: 23.44 KG/M2 | RESPIRATION RATE: 16 BRPM

## 2021-06-21 VITALS — OXYGEN SATURATION: 94 % | HEART RATE: 70 BPM

## 2021-06-21 DIAGNOSIS — R05 COUGH: ICD-10-CM

## 2021-06-21 PROCEDURE — A7003 NEBULIZER ADMINISTRATION SET: CPT

## 2021-06-21 PROCEDURE — 99213 OFFICE O/P EST LOW 20 MIN: CPT | Mod: 25

## 2021-06-21 PROCEDURE — 94640 AIRWAY INHALATION TREATMENT: CPT

## 2021-06-21 NOTE — REVIEW OF SYSTEMS
[Shortness Of Breath] : no shortness of breath [Wheezing] : wheezing [Cough] : cough [Dyspnea on Exertion] : dyspnea on exertion [Negative] : Heme/Lymph

## 2021-06-21 NOTE — PLAN
[FreeTextEntry1] : 81-year-old female as noted wheezing most prominent left lower base, slightly improved after nebulizer treatment. In prescription for Zithromax and Advair, chest x-ray done today. She will check with her daughter about name of pulmonologist and make an appointment for followup in the meantime if her symptoms increase or persist she'll return to this office ASAP

## 2021-06-21 NOTE — HISTORY OF PRESENT ILLNESS
[FreeTextEntry8] : 81-year-old female with history of COPD presents complaining of cough and wheezing for the past 2 weeks, keeping her up at night but denies fever or chills. She usually uses her albuterol inhaler occasionally but for the past week or so she's been using it along with her nebulizer daily. She sees pulmonologist but not sure of the name or when she last saw him

## 2021-06-21 NOTE — PHYSICAL EXAM
[No Acute Distress] : no acute distress [Well Nourished] : well nourished [Well Developed] : well developed [Well-Appearing] : well-appearing [Normal Sclera/Conjunctiva] : normal sclera/conjunctiva [PERRL] : pupils equal round and reactive to light [EOMI] : extraocular movements intact [Normal Outer Ear/Nose] : the outer ears and nose were normal in appearance [Normal Oropharynx] : the oropharynx was normal [No Lymphadenopathy] : no lymphadenopathy [No JVD] : no jugular venous distention [Supple] : supple [Thyroid Normal, No Nodules] : the thyroid was normal and there were no nodules present [No Respiratory Distress] : no respiratory distress  [No Accessory Muscle Use] : no accessory muscle use [Normal Rate] : normal rate  [Regular Rhythm] : with a regular rhythm [Normal S1, S2] : normal S1 and S2 [No Murmur] : no murmur heard [No Carotid Bruits] : no carotid bruits [No Abdominal Bruit] : a ~M bruit was not heard ~T in the abdomen [No Varicosities] : no varicosities [Pedal Pulses Present] : the pedal pulses are present [No Edema] : there was no peripheral edema [No Palpable Aorta] : no palpable aorta [No Extremity Clubbing/Cyanosis] : no extremity clubbing/cyanosis [Soft] : abdomen soft [Non Tender] : non-tender [Non-distended] : non-distended [No Masses] : no abdominal mass palpated [No HSM] : no HSM [Normal Bowel Sounds] : normal bowel sounds [Normal Posterior Cervical Nodes] : no posterior cervical lymphadenopathy [Normal Anterior Cervical Nodes] : no anterior cervical lymphadenopathy [No CVA Tenderness] : no CVA  tenderness [No Spinal Tenderness] : no spinal tenderness [No Joint Swelling] : no joint swelling [Grossly Normal Strength/Tone] : grossly normal strength/tone [No Rash] : no rash [Coordination Grossly Intact] : coordination grossly intact [No Focal Deficits] : no focal deficits [Normal Gait] : normal gait [Deep Tendon Reflexes (DTR)] : deep tendon reflexes were 2+ and symmetric [Normal Affect] : the affect was normal [Normal Insight/Judgement] : insight and judgment were intact [de-identified] : Diffuse wheezing and rhonchi

## 2021-07-12 ENCOUNTER — APPOINTMENT (OUTPATIENT)
Dept: INTERNAL MEDICINE | Facility: CLINIC | Age: 81
End: 2021-07-12
Payer: MEDICARE

## 2021-07-12 VITALS
BODY MASS INDEX: 23.44 KG/M2 | DIASTOLIC BLOOD PRESSURE: 70 MMHG | HEIGHT: 64.5 IN | WEIGHT: 139 LBS | SYSTOLIC BLOOD PRESSURE: 130 MMHG

## 2021-07-12 DIAGNOSIS — R53.83 OTHER FATIGUE: ICD-10-CM

## 2021-07-12 DIAGNOSIS — Z78.0 ASYMPTOMATIC MENOPAUSAL STATE: ICD-10-CM

## 2021-07-12 DIAGNOSIS — I35.1 NONRHEUMATIC AORTIC (VALVE) INSUFFICIENCY: ICD-10-CM

## 2021-07-12 DIAGNOSIS — M81.0 AGE-RELATED OSTEOPOROSIS W/OUT CURRENT PATHOLOGICAL FRACTURE: ICD-10-CM

## 2021-07-12 PROCEDURE — 36415 COLL VENOUS BLD VENIPUNCTURE: CPT

## 2021-07-12 PROCEDURE — G0439: CPT

## 2021-07-12 RX ORDER — AZITHROMYCIN 500 MG/1
500 TABLET, FILM COATED ORAL DAILY
Qty: 1 | Refills: 0 | Status: DISCONTINUED | COMMUNITY
Start: 2021-06-21 | End: 2021-07-12

## 2021-07-12 NOTE — HEALTH RISK ASSESSMENT
[Very Good] : ~his/her~  mood as very good [No] : No [No falls in past year] : Patient reported no falls in the past year [0] : 2) Feeling down, depressed, or hopeless: Not at all (0) [Patient declined mammogram] : Patient declined mammogram [Patient declined PAP Smear] : Patient declined PAP Smear [Patient reported bone density results were abnormal] : Patient reported bone density results were abnormal [Patient declined colonoscopy] : Patient declined colonoscopy [HIV test declined] : HIV test declined [Hepatitis C test declined] : Hepatitis C test declined [] : No [[id number]] : 0 [MammogramComments] : as per pt, mammo was done in [place] [PapSmearComments] : a [BoneDensityDate] : 03/19 [BoneDensityComments] : ordered today  [ColonoscopyComments] : as per pt, colo was normal done in [place]

## 2021-07-12 NOTE — HISTORY OF PRESENT ILLNESS
[FreeTextEntry1] : Annual exam [de-identified] : 81-year-old female with h/o NSTEMI and stent 4/17,  intentional tremor, walks with cane, osteoporosis, history of lung cancer presents for annual exam. Feels very well, independent of ADLs, able to drive. States that recent physical therapy has helped significantly and continues to do exercise routine on her on.\par \par She uses a small portable nebulizer but is not sure exactly what medication she uses with it, she got it from doctor in [place]. She will call me know so can be refilled.\par \par She states that she is not taking Actonel and does not remember ever taking it, she will check at home.\par \par Also complaining about cost of propranolol ER stating that she never started increased dose because of the cost. She never told ordering provider\par \par Refuses mammogram

## 2021-07-12 NOTE — PHYSICAL EXAM
[Normal Female:] : bladder was normal on palpation [Normal] : no joint swelling, grossly normal strength/tone [de-identified] : RRR, 2/6 sys murmur, S1 S2-WNL [de-identified] : Deferred GYN; Denies pain, lumps and discharge [FreeTextEntry1] : Deferred GI/GYN [de-identified] : No lymphadenopathy [de-identified] : Denies excessive thirst, urination, fatigue [de-identified] : No rash or skin lesion [de-identified] : unsteady gait, uses cane. tremor [de-identified] : Alert and Oriented x3.  Appropriate mood and affect no suicidal ideation/no depression/no anxiety/no insomnia

## 2021-07-12 NOTE — PLAN
[FreeTextEntry1] : 81-year-old female is noted presents for annual exam, feels well, denies chest pain shortness of breath palpitations dizziness.\par Reviewed diet and stressed the importance of exercise and continuing physical therapy routine.\par Advise patient to speak with neurology regarding propranolol and perhaps changing extended-release to regular-may be less expensive?\par \par Doing well with her exercises at home, remaining independent.\par \par She'll make an appointment for a bone density. She has appointments in place for neurology and cardiology\par We'll review medication for osteoporosis pending bone density results\par Call next week to review labs

## 2021-07-13 LAB
25(OH)D3 SERPL-MCNC: 67.8 NG/ML
ALBUMIN SERPL ELPH-MCNC: 4.3 G/DL
ALP BLD-CCNC: 73 U/L
ALT SERPL-CCNC: 13 U/L
ANION GAP SERPL CALC-SCNC: 11 MMOL/L
APPEARANCE: CLEAR
AST SERPL-CCNC: 27 U/L
BASOPHILS # BLD AUTO: 0.06 K/UL
BASOPHILS NFR BLD AUTO: 1.2 %
BILIRUB SERPL-MCNC: 0.3 MG/DL
BILIRUBIN URINE: NEGATIVE
BLOOD URINE: NEGATIVE
BUN SERPL-MCNC: 17 MG/DL
CALCIUM SERPL-MCNC: 10 MG/DL
CHLORIDE SERPL-SCNC: 101 MMOL/L
CHOLEST SERPL-MCNC: 211 MG/DL
CO2 SERPL-SCNC: 30 MMOL/L
COLOR: YELLOW
CREAT SERPL-MCNC: 0.78 MG/DL
EOSINOPHIL # BLD AUTO: 0.16 K/UL
EOSINOPHIL NFR BLD AUTO: 3.3 %
FOLATE SERPL-MCNC: >20 NG/ML
GLUCOSE QUALITATIVE U: NEGATIVE
GLUCOSE SERPL-MCNC: 108 MG/DL
HCT VFR BLD CALC: 45.7 %
HDLC SERPL-MCNC: 117 MG/DL
HGB BLD-MCNC: 14.5 G/DL
IMM GRANULOCYTES NFR BLD AUTO: 0.2 %
KETONES URINE: NEGATIVE
LDLC SERPL CALC-MCNC: 81 MG/DL
LEUKOCYTE ESTERASE URINE: NEGATIVE
LYMPHOCYTES # BLD AUTO: 1.29 K/UL
LYMPHOCYTES NFR BLD AUTO: 26.5 %
MAN DIFF?: NORMAL
MCHC RBC-ENTMCNC: 31.7 GM/DL
MCHC RBC-ENTMCNC: 33.2 PG
MCV RBC AUTO: 104.6 FL
MONOCYTES # BLD AUTO: 0.38 K/UL
MONOCYTES NFR BLD AUTO: 7.8 %
NEUTROPHILS # BLD AUTO: 2.97 K/UL
NEUTROPHILS NFR BLD AUTO: 61 %
NITRITE URINE: NEGATIVE
NONHDLC SERPL-MCNC: 95 MG/DL
PH URINE: 6.5
PLATELET # BLD AUTO: 174 K/UL
POTASSIUM SERPL-SCNC: 5 MMOL/L
PROT SERPL-MCNC: 7.4 G/DL
PROTEIN URINE: NEGATIVE
RBC # BLD: 4.37 M/UL
RBC # FLD: 13.2 %
SODIUM SERPL-SCNC: 142 MMOL/L
SPECIFIC GRAVITY URINE: 1.02
T4 FREE SERPL-MCNC: 1.7 NG/DL
TRIGL SERPL-MCNC: 67 MG/DL
TSH SERPL-ACNC: 2.86 UIU/ML
UROBILINOGEN URINE: NORMAL
VIT B12 SERPL-MCNC: 611 PG/ML
WBC # FLD AUTO: 4.87 K/UL

## 2021-07-14 ENCOUNTER — RESULT REVIEW (OUTPATIENT)
Age: 81
End: 2021-07-14

## 2021-07-21 ENCOUNTER — APPOINTMENT (OUTPATIENT)
Dept: NEUROLOGY | Facility: CLINIC | Age: 81
End: 2021-07-21
Payer: MEDICARE

## 2021-07-21 VITALS
DIASTOLIC BLOOD PRESSURE: 88 MMHG | HEIGHT: 64.5 IN | SYSTOLIC BLOOD PRESSURE: 162 MMHG | TEMPERATURE: 97.3 F | BODY MASS INDEX: 23.44 KG/M2 | WEIGHT: 139 LBS | HEART RATE: 67 BPM

## 2021-07-21 PROCEDURE — 99214 OFFICE O/P EST MOD 30 MIN: CPT

## 2021-07-21 RX ORDER — DOCUSATE SODIUM 100 MG/1
100 CAPSULE, LIQUID FILLED ORAL
Refills: 0 | Status: DISCONTINUED | COMMUNITY
End: 2021-07-21

## 2021-07-21 RX ORDER — RISEDRONATE SODIUM 35 MG/1
35 TABLET, FILM COATED ORAL
Qty: 3 | Refills: 3 | Status: DISCONTINUED | COMMUNITY
Start: 2019-07-24 | End: 2021-07-21

## 2021-07-21 NOTE — HISTORY OF PRESENT ILLNESS
[FreeTextEntry1] : Sharon Teresa is an 81 year old  Female with a PMHx of lung cancer, s/p MI, s/p lung resection on the right ~6 years ago, COPD, HLD, Depression, Hypothyroidism, HTN who presents for follow up in the Movement Disorders Clinic at [facility]. \par \par Interval history:\par Since the last visit, the patient reports that she was unable to tolerate propranolol 80mg ER daily due to dizziness. She does require using her inhalers for her COPD almost daily. There is concern that she is taking beta agonists with a beta blocker. She has learned to live with her tremors but would like better control of them. She denies any falls. No trouble sleeping. No constipation. No trouble with urination. She has completed physical therapy for her gait and is practicing the exercises daily.\par \par Initial history:\par She has had a left hand tremor for years. Her medication was changed to propranolol about a month ago, it has helped. Less tremor in the right hand. The tremors interfere with writing. No tremors in the head. \par \par Buttons are challenging. No trouble tying shoelaces or cutting food. No trouble turning or adjusting in bed at night. She walks with a cane. She is afraid of falling. She shuffles when walking. She has fallen in the past, last was 1.5 years ago. Once she fell stepping off a curb and broke her shoulder on the left. She fell out of bed when she was living in [place].\par \par No changes in sense of smell.\par Writing is bigger.\par Her children think she speaks louder.\par No trouble swallowing.\par No constipation, she has a daily bowel movement.\par No trouble with urination.\par She has a history of acting out dreams and she remembers her dreams. She sleeps well otherwise.\par No history of hallucinations. \par No dizziness when standing.\par \par Family history: her mother used to shuffle, no one else with tremors\par Social history: lives alone, minimal exercise for balance and leg strengthening

## 2021-07-21 NOTE — DISCUSSION/SUMMARY
[FreeTextEntry1] : Sharon Teresa is an 81 year old  Female with a PMHx of lung cancer, s/p MI, s/p lung resection on the right ~6 years ago, COPD, HLD, Depression, Hypothyroidism, HTN who presents for follow up in the Movement Disorders Clinic at [facility]. \par \par The patient's history is consistent with Essential tremor, likely Essential tremor plus with mild parkinsonian features on examination. The patient reports that she has had a good response to propranolol with regard to her tremors, but cannot titrate higher due to dizziness. In addition, I would recommend that we stop the propranolol given her lung disease and use of beta agonists. \par \par Plan:\par - Discontinue Propranolol 60mg ER\par - Start Primidone 50mg at bedtime x 2 weeks, then increase to 2 tabs at bedtime. It has been ordered for her before but unclear why it was discontinued. \par - If no response or side effects occur, will give a trial of Topamax vs. Gabapentin. Other considerations are a trial of levodopa given her parkinsonian symptoms and rest tremor\par - Encouraged to increase exercise and physical activity and maintain an active social and intellectual life.\par \par RTC 3 months\par \par All questions were answered to her satisfaction. I spent 30 minutes with this patient.

## 2021-07-21 NOTE — PHYSICAL EXAM
[Person] : oriented to person [Place] : oriented to place [Time] : oriented to time [Concentration Intact] : normal concentrating ability [Comprehension] : comprehension intact [Cranial Nerves Optic (II)] : visual acuity intact bilaterally,  visual fields full to confrontation, pupils equal round and reactive to light [Cranial Nerves Oculomotor (III)] : extraocular motion intact [Cranial Nerves Trigeminal (V)] : facial sensation intact symmetrically [Cranial Nerves Facial (VII)] : face symmetrical [Cranial Nerves Vestibulocochlear (VIII)] : hearing was intact bilaterally [Cranial Nerves Glossopharyngeal (IX)] : tongue and palate midline [Cranial Nerves Accessory (XI - Cranial And Spinal)] : head turning and shoulder shrug symmetric [Cranial Nerves Hypoglossal (XII)] : there was no tongue deviation with protrusion [Motor Strength] : muscle strength was normal in all four extremities [Sensation Tactile Decrease] : light touch was intact [1+] : Ankle jerk left 1+ [Paresis Pronator Drift Right-Sided] : no pronator drift on the right [Paresis Pronator Drift Left-Sided] : no pronator drift on the left [Coordination - Dysmetria Impaired Finger-to-Nose Bilateral] : not present [FreeTextEntry1] : Face is mildly masked with decreased blinking rate. EOMI, normal saccades. Voice is mildly hypophonic, mild vocal tremor. Mild mixed head tremor.\par There are tremors of the hands, with rest and posture, left more than right. No leg tremors.  \par Bradykinesia is present bilaterally with finger tapping, rapid alternating and sequential movements, right more than left.\par Mild cogwheel rigidity bilaterally, right more than left.  \par No trouble standing with arms crossed. Posture is mildly stooped. \par Gait is wide based, shortened stride length, no shuffling, multistep turns. Re-emergent resting left hand tremor while walking. Decreased left arm swing. \par Postural reflexes are intact.

## 2021-07-29 ENCOUNTER — NON-APPOINTMENT (OUTPATIENT)
Age: 81
End: 2021-07-29

## 2021-07-29 ENCOUNTER — APPOINTMENT (OUTPATIENT)
Dept: CARDIOLOGY | Facility: CLINIC | Age: 81
End: 2021-07-29
Payer: MEDICARE

## 2021-07-29 PROCEDURE — 93000 ELECTROCARDIOGRAM COMPLETE: CPT

## 2021-07-29 PROCEDURE — 99214 OFFICE O/P EST MOD 30 MIN: CPT

## 2021-07-29 RX ORDER — PROPRANOLOL HYDROCHLORIDE 80 MG/1
80 CAPSULE, EXTENDED RELEASE ORAL DAILY
Qty: 90 | Refills: 3 | Status: DISCONTINUED | COMMUNITY
Start: 2021-01-26 | End: 2021-07-29

## 2021-07-29 NOTE — ASSESSMENT
[FreeTextEntry1] : #1) known history of CAD with prior small non--STEMI, and subsequent cardiac catheterization revealing only branch disease of the OM. There was nonobstructive disease in the main circumflex, LAD and RCA. She is status post successful percutaneous revascularization with stenting. She is asymptomatic. No recent cardiovascular events. There were no contraindication to surgery. \par #2) history of essential hypertension--reasonably controlled on propranolol and losartan. Will resume Propranolol\par #3) murmur and echo are compatible with mild to moderate aortic stenosis. This is asymptomatic. There is no indication for intervention in an  asymptomatic pt with  mild to moderate aortic stenosis. She will require periodic followup studies.\par --There is also moderate aortic insufficiency which is asymptomatic and associated with normal LV dimensions. No intervention is required here as well.\par #4) history of lung cancer, status post resection. ?? Suspect curative\par #5) marked tremor, per neurology\par #6) history of substance abuse (alcohol).\par #7) Hyperlipidemia\par #8) Treated hypothyroidism--euthyroid\par \par NB: Needs to be on propranolol for BP, not just tremor.\par \par \par

## 2021-08-09 ENCOUNTER — RX RENEWAL (OUTPATIENT)
Age: 81
End: 2021-08-09

## 2021-09-08 DIAGNOSIS — Z23 ENCOUNTER FOR IMMUNIZATION: ICD-10-CM

## 2021-11-03 ENCOUNTER — NON-APPOINTMENT (OUTPATIENT)
Age: 81
End: 2021-11-03

## 2021-11-03 ENCOUNTER — APPOINTMENT (OUTPATIENT)
Dept: NEUROLOGY | Facility: CLINIC | Age: 81
End: 2021-11-03
Payer: MEDICARE

## 2021-11-03 VITALS
HEIGHT: 64.5 IN | BODY MASS INDEX: 23.44 KG/M2 | DIASTOLIC BLOOD PRESSURE: 58 MMHG | WEIGHT: 139 LBS | HEART RATE: 62 BPM | TEMPERATURE: 98.2 F | SYSTOLIC BLOOD PRESSURE: 94 MMHG

## 2021-11-03 PROCEDURE — 99214 OFFICE O/P EST MOD 30 MIN: CPT

## 2021-11-03 NOTE — HISTORY OF PRESENT ILLNESS
[FreeTextEntry1] : Sharon Teresa is an 81 year old  Female with a PMHx of lung cancer, s/p MI, s/p lung resection on the right ~6 years ago, COPD, HLD, Depression, Hypothyroidism, HTN who presents for follow up in the Movement Disorders Clinic at [facility]. \par \par Interval history:\par Since the last visit, she reports that the tremors are intermittent but the primidone has helped. She denies side effects from primidone. She cannot stop propranolol because of her heart disease. She reports her walking is better. Tremors do not interfere with her activities. If she picks up a glass with her left hand she will spill everything, so she uses her right hand. If she stabilizes the left hand with her right she is okay. She is sleeping well. \par \par Current meds:\par Propranolol 60mg daily\par Primidone 100mg at bedtime\par \par Initial history:\par She has had a left hand tremor for years. Her medication was changed to propranolol about a month ago, it has helped. Less tremor in the right hand. The tremors interfere with writing. No tremors in the head. \par \par Buttons are challenging. No trouble tying shoelaces or cutting food. No trouble turning or adjusting in bed at night. She walks with a cane. She is afraid of falling. She shuffles when walking. She has fallen in the past, last was 1.5 years ago. Once she fell stepping off a curb and broke her shoulder on the left. She fell out of bed when she was living in [place].\par \par No changes in sense of smell.\par Writing is bigger.\par Her children think she speaks louder.\par No trouble swallowing.\par No constipation, she has a daily bowel movement.\par No trouble with urination.\par She has a history of acting out dreams and she remembers her dreams. She sleeps well otherwise.\par No history of hallucinations. \par No dizziness when standing.\par \par Family history: her mother used to shuffle, no one else with tremors\par Social history: lives alone, minimal exercise for balance and leg strengthening

## 2021-11-03 NOTE — PHYSICAL EXAM
[Person] : oriented to person [Place] : oriented to place [Time] : oriented to time [Concentration Intact] : normal concentrating ability [Comprehension] : comprehension intact [Cranial Nerves Optic (II)] : visual acuity intact bilaterally,  visual fields full to confrontation, pupils equal round and reactive to light [Cranial Nerves Oculomotor (III)] : extraocular motion intact [Cranial Nerves Trigeminal (V)] : facial sensation intact symmetrically [Cranial Nerves Facial (VII)] : face symmetrical [Cranial Nerves Vestibulocochlear (VIII)] : hearing was intact bilaterally [Cranial Nerves Glossopharyngeal (IX)] : tongue and palate midline [Cranial Nerves Accessory (XI - Cranial And Spinal)] : head turning and shoulder shrug symmetric [Cranial Nerves Hypoglossal (XII)] : there was no tongue deviation with protrusion [Motor Strength] : muscle strength was normal in all four extremities [Sensation Tactile Decrease] : light touch was intact [1+] : Ankle jerk left 1+ [Paresis Pronator Drift Right-Sided] : no pronator drift on the right [Paresis Pronator Drift Left-Sided] : no pronator drift on the left [Coordination - Dysmetria Impaired Finger-to-Nose Bilateral] : not present [FreeTextEntry1] : Face is not significantly masked. EOMI, normal saccades. Voice is mildly hypophonic, mild vocal tremor. Mild mixed head tremor, intermittent.\par No significant resting tremors. Mild postural tremors, left more than right. Mild-moderate action tremors, left more than right, particularly with writing. No leg tremors.\par Bradykinesia was slight on the right.\par No significant rigidity.\par No trouble standing with arms crossed. Posture is mildly stooped. \par Gait is wide based, shortened stride length, no shuffling, multistep turns. Re-emergent resting left hand tremor while walking. Decreased left arm swing. \par Postural reflexes are intact.

## 2021-11-03 NOTE — DISCUSSION/SUMMARY
[FreeTextEntry1] : Sharon Teresa is an 81 year old  Female with a PMHx of lung cancer, s/p MI, s/p lung resection on the right ~6 years ago, COPD, HLD, Depression, Hypothyroidism, HTN who presents for follow up in the Movement Disorders Clinic at [facility]. \par \par The patient's history is consistent with Essential tremor, likely Essential tremor plus with mild parkinsonian features on examination. The patient reports that she has had a good response to propranolol with regard to her tremors, but cannot titrate higher due to dizziness. She continues on propranolol for blood pressure control. Since adding Primidone 100mg at bedtime, she has noticed improvement in her tremors. She would like better control of her tremors as she is left handed and her left sided tremors are most prominent.\par \par Plan:\par - Continue Propranolol 60mg daily\par - Increase Primidone from 100mg to 150mg at bedtime. Will continue to titrate up as tolerated.\par - If no response or side effects occur, will give a trial of Topamax vs. Gabapentin. Other considerations are a trial of levodopa given her parkinsonian symptoms and rest tremor\par - Encouraged to increase exercise and physical activity and maintain an active social and intellectual life.\par \par RTC 4 months\par \par All questions were answered to her satisfaction. I spent 30 minutes with this patient.

## 2021-11-05 ENCOUNTER — RX RENEWAL (OUTPATIENT)
Age: 81
End: 2021-11-05

## 2021-11-18 ENCOUNTER — APPOINTMENT (OUTPATIENT)
Dept: CARDIOLOGY | Facility: CLINIC | Age: 81
End: 2021-11-18
Payer: MEDICARE

## 2021-11-18 ENCOUNTER — NON-APPOINTMENT (OUTPATIENT)
Age: 81
End: 2021-11-18

## 2021-11-18 VITALS
WEIGHT: 138 LBS | OXYGEN SATURATION: 99 % | BODY MASS INDEX: 23.27 KG/M2 | DIASTOLIC BLOOD PRESSURE: 68 MMHG | HEIGHT: 64.5 IN | SYSTOLIC BLOOD PRESSURE: 108 MMHG | HEART RATE: 65 BPM

## 2021-11-18 PROCEDURE — 36415 COLL VENOUS BLD VENIPUNCTURE: CPT

## 2021-11-18 PROCEDURE — 99214 OFFICE O/P EST MOD 30 MIN: CPT

## 2021-11-18 PROCEDURE — 93000 ELECTROCARDIOGRAM COMPLETE: CPT

## 2021-11-18 NOTE — PHYSICAL EXAM
[General Appearance - Well Developed] : well developed [Normal Appearance] : normal appearance [Well Groomed] : well groomed [General Appearance - Well Nourished] : well nourished [No Deformities] : no deformities [General Appearance - In No Acute Distress] : no acute distress [Normal Conjunctiva] : the conjunctiva exhibited no abnormalities [Eyelids - No Xanthelasma] : the eyelids demonstrated no xanthelasmas [Normal Oral Mucosa] : normal oral mucosa [No Oral Pallor] : no oral pallor [No Oral Cyanosis] : no oral cyanosis [Normal Jugular Venous A Waves Present] : normal jugular venous A waves present [Normal Jugular Venous V Waves Present] : normal jugular venous V waves present [No Jugular Venous Hernandez A Waves] : no jugular venous hernandez A waves [Respiration, Rhythm And Depth] : normal respiratory rhythm and effort [Exaggerated Use Of Accessory Muscles For Inspiration] : no accessory muscle use [Auscultation Breath Sounds / Voice Sounds] : lungs were clear to auscultation bilaterally [Abdomen Soft] : soft [Abdomen Tenderness] : non-tender [Abdomen Mass (___ Cm)] : no abdominal mass palpated [Gait - Sufficient For Exercise Testing] : the gait was sufficient for exercise testing [Abnormal Walk] : normal gait [Nail Clubbing] : no clubbing of the fingernails [Cyanosis, Localized] : no localized cyanosis [Petechial Hemorrhages (___cm)] : no petechial hemorrhages [Skin Color & Pigmentation] : normal skin color and pigmentation [] : no rash [No Venous Stasis] : no venous stasis [Skin Lesions] : no skin lesions [No Skin Ulcers] : no skin ulcer [No Xanthoma] : no  xanthoma was observed [Oriented To Time, Place, And Person] : oriented to person, place, and time [Affect] : the affect was normal [Mood] : the mood was normal [No Anxiety] : not feeling anxious [5th Left ICS - MCL] : palpated at the 5th LICS in the midclavicular line [Normal] : normal [Normal Rate] : normal [Rhythm Regular] : regular [Normal S1] : normal S1 [Normal S2] : normal S2 [II] : a grade 2 [No Abnormalities] : the abdominal aorta was not enlarged and no bruit was heard [Right Carotid Bruit] : no bruit heard over the right carotid [Left Carotid Bruit] : no bruit heard over the left carotid [Right Femoral Bruit] : no bruit heard over the right femoral artery [Left Femoral Bruit] : no bruit heard over the left femoral artery

## 2021-11-18 NOTE — HISTORY OF PRESENT ILLNESS
[FreeTextEntry1] : 80 yo woman, seen after right eye vitrectomy, with multiple medical issues, including:\par 1) Hx of lung cancer--s/p resection. Felt in remission\par 2) essential hypertension, on losartan and metoprolol tartrate 25\par 3) Hypothyroidism on levothyroxine\par 4) COPD\par 5) Hx of CAD\par \par The patient raised her family here in [place], retired to [place], but then came back to the area when her  passed away. She lives locally and has grown children and his sister.\par \par -In the past she was hospitalized for pneumonia and sepsis. She was found to have lung cancer and underwent successful lobectomy. The surgery was felt to be curative. \par -In 2017 she had a non-Q-wave MI. \par ----------------------------------------------------------------------------------------------------------------------------------\par Cardiac catheterization on April 5, 2017 revealed:\par -Only mild LAD disease.\par -Severe stenosis of OM -1.\par -Nonobstructive disease the right coronary artery.\par --LV systolic function was normal.\par She underwent successful stenting of the OM and has been clinically asymptomatic these last 3 years.\par ----------------------------------------------------------------------------------------------------------------------------------\par The patient was diagnosed as having dry macular degeneration in her left eye and 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 She is s/p  right vitrectomy on July 23, 2020.\par \par The patient is asymptomatic.\par She does not experience angina at all. No shortness of breath or dyspnea on exertion.\par No history of congestive heart failure. No orthopnea or PND. No peripheral edema.\par She denies knowledge of a heart murmur or rheumatic fever.\par \par The patient smoked as a young adult but not in the last 40+ years\par There is a history of alcohol abuse.\par The patient is , lives at [place], and has adult children that live locally.\par ====================================================================================\par **(*Echocardiogram (October 21, 2020):\par Normal LV size and contractility. EF 60%.\par Grade 1 diastolic dysfunction.\par Mild pulmonary hypertension with RVSP  46.1\par Mild left atrial enlargement. \par ***Moderate aortic valve calcification\par --With mild to moderate aortic stenosis(36.6 /18.6 mean and aortic valve area 0.93 cm square)\par Mitral valve thickening\par ====================================================================================\par Currently:  Had her two Covid shots and her neurologist increased propranolol LA from 60 to 80 mg daily.\par Saw neurology who  added primidone\par \par EKG (Today)\par Sinus  Rhythm \par nl axis\par LVH\par Nonspecific ST-T changes persist, as before\par NSC\par \par Labs from July 12, 2021:\par TSH and free T4--normal\par BUN 17, creatinine 0.78 and potassium 5.0.\par Cholesterol 211 with atrial 117 and LDL 81. Triglycerides 66\par CBC and urinalysis--both normal\par

## 2021-11-18 NOTE — ASSESSMENT
[FreeTextEntry1] : #1) known history of CAD with prior small non--STEMI, and subsequent cardiac catheterization revealing only branch disease of the OM. There was nonobstructive disease in the main circumflex, LAD and RCA. She is status post successful percutaneous revascularization with stenting. She is asymptomatic. No recent cardiovascular events. There were no contraindication to surgery. \par #2) history of essential hypertension--***BP Borderline low on losartan and Propranolol LA 60. Will reduce Losartan from 50 to 25 mg and consider d/c next visit\par #3) murmur and echo are compatible with mild to moderate aortic stenosis. This is asymptomatic. There is no indication for intervention in an  asymptomatic pt with  mild to moderate aortic stenosis. She will require periodic followup studies.\par --There is also moderate aortic insufficiency which is asymptomatic and associated with normal LV dimensions. No intervention is required here as well.\par ***WILL PLAN ECHO AFTER NEXT VISIT\par #4) history of lung cancer, status post resection. ?? Suspect curative\par #5) marked tremor, per neurology\par #6) history of substance abuse (alcohol).\par #7) Hyperlipidemia\par #8) Treated hypothyroidism--euthyroid\par \par NB: Needs to be on propranolol for BP, not just tremor.\par \par \par

## 2021-11-19 LAB
ALBUMIN SERPL ELPH-MCNC: 4.4 G/DL
ALP BLD-CCNC: 67 U/L
ALT SERPL-CCNC: 15 U/L
ANION GAP SERPL CALC-SCNC: 13 MMOL/L
AST SERPL-CCNC: 19 U/L
BASOPHILS # BLD AUTO: 0.05 K/UL
BASOPHILS NFR BLD AUTO: 1 %
BILIRUB SERPL-MCNC: 0.3 MG/DL
BUN SERPL-MCNC: 26 MG/DL
CALCIUM SERPL-MCNC: 10 MG/DL
CHLORIDE SERPL-SCNC: 99 MMOL/L
CHOLEST SERPL-MCNC: 214 MG/DL
CO2 SERPL-SCNC: 27 MMOL/L
CREAT SERPL-MCNC: 0.91 MG/DL
EOSINOPHIL # BLD AUTO: 0.09 K/UL
EOSINOPHIL NFR BLD AUTO: 1.8 %
GLUCOSE SERPL-MCNC: 106 MG/DL
HCT VFR BLD CALC: 40.2 %
HDLC SERPL-MCNC: 101 MG/DL
HGB BLD-MCNC: 13 G/DL
IMM GRANULOCYTES NFR BLD AUTO: 0.2 %
LDLC SERPL CALC-MCNC: 72 MG/DL
LYMPHOCYTES # BLD AUTO: 1.38 K/UL
LYMPHOCYTES NFR BLD AUTO: 28.2 %
MAN DIFF?: NORMAL
MCHC RBC-ENTMCNC: 32.3 GM/DL
MCHC RBC-ENTMCNC: 33.3 PG
MCV RBC AUTO: 103.1 FL
MONOCYTES # BLD AUTO: 0.36 K/UL
MONOCYTES NFR BLD AUTO: 7.3 %
NEUTROPHILS # BLD AUTO: 3.01 K/UL
NEUTROPHILS NFR BLD AUTO: 61.5 %
NONHDLC SERPL-MCNC: 113 MG/DL
PLATELET # BLD AUTO: 220 K/UL
POTASSIUM SERPL-SCNC: 4.6 MMOL/L
PROT SERPL-MCNC: 7.2 G/DL
RBC # BLD: 3.9 M/UL
RBC # FLD: 13 %
SODIUM SERPL-SCNC: 139 MMOL/L
TRIGL SERPL-MCNC: 208 MG/DL
WBC # FLD AUTO: 4.9 K/UL

## 2021-12-07 ENCOUNTER — RX RENEWAL (OUTPATIENT)
Age: 81
End: 2021-12-07

## 2021-12-10 ENCOUNTER — APPOINTMENT (OUTPATIENT)
Dept: ENDOCRINOLOGY | Facility: CLINIC | Age: 81
End: 2021-12-10
Payer: MEDICARE

## 2021-12-10 ENCOUNTER — NON-APPOINTMENT (OUTPATIENT)
Age: 81
End: 2021-12-10

## 2021-12-10 ENCOUNTER — LABORATORY RESULT (OUTPATIENT)
Age: 81
End: 2021-12-10

## 2021-12-10 VITALS
HEART RATE: 70 BPM | WEIGHT: 132 LBS | BODY MASS INDEX: 22.81 KG/M2 | HEIGHT: 63.75 IN | SYSTOLIC BLOOD PRESSURE: 120 MMHG | OXYGEN SATURATION: 94 % | DIASTOLIC BLOOD PRESSURE: 60 MMHG

## 2021-12-10 PROCEDURE — 99205 OFFICE O/P NEW HI 60 MIN: CPT

## 2021-12-10 RX ORDER — FLUTICASONE PROPIONATE AND SALMETEROL 250; 50 UG/1; UG/1
250-50 POWDER RESPIRATORY (INHALATION)
Qty: 1 | Refills: 3 | Status: DISCONTINUED | COMMUNITY
Start: 2021-06-21 | End: 2021-12-10

## 2021-12-10 RX ORDER — AZELASTINE HYDROCHLORIDE 137 UG/1
0.1 SPRAY, METERED NASAL TWICE DAILY
Qty: 3 | Refills: 3 | Status: DISCONTINUED | COMMUNITY
Start: 2019-07-24 | End: 2021-12-10

## 2021-12-10 RX ORDER — CALCIUM CITRATE/VITAMIN D3 315MG-6.25
TABLET ORAL
Refills: 0 | Status: DISCONTINUED | COMMUNITY
End: 2021-12-10

## 2021-12-10 NOTE — ASSESSMENT
[Teriparatide/Abaloparatide Therapy] : Risks and benefits of Teriparatide/Abaloparatide therapy were discussed with the patient including potential risk of osteosarcoma

## 2021-12-10 NOTE — HISTORY OF PRESENT ILLNESS
[Sedentary] : a sedentary lifestyle [Previous Fragility Fracture] : previous fragility fracture(s) [Regular Dental Follow-Up] : regular dental follow-up [Uses a Walker/Cane] : use of a walker/cane [Loss of Height] : loss of height [FreeTextEntry1] : Ms. SANDER BLEVINS is a 81 year old female with Parkinson disease sent in a consult by Remedios Kohli for severe osteoporosis \par \par on Vit D 1,000 IU qd\par Calcium does not know the dose once a day\par severe osteoporosis on DEXA reviewed done 7/21 \par had a fall 4yrs ago broke her arm  [Low Vit D Intake/Exposure] : no low vitamin D intake [Premat. Menopause (surgery)] : no history of premature surgical menopause [Amenorrhea] : no amenorrhea [Disordered Eating] : no history of disordered eating [Taking Steroids] : no history of taking steroids [Hyperparathyroidism] : no history of hyperparathyroidism [Diabetes] : no history of diabetes [Kidney Stones] : no history of kidney stones [Excessive Alcohol Intake] : no excessive alcohol intake [Excessive Soda] : no excessive soda [Current Smoking___(ppd)] : not currently smoking [Family History of Breast Cancer] : no family history of breast cancer [Family History of Hip Fracture] : no family history of hip fracture [Family History of Osteoporosis] : no family history of osteoporosis [History of Radiation Therapy] : no history of radiation therapy [History of Blood Clots] : no history of blood clots [High Fall Risk] : no fall risk [Frequent Falls] : no frequent falls [de-identified] : has denture

## 2022-02-18 ENCOUNTER — LABORATORY RESULT (OUTPATIENT)
Age: 82
End: 2022-02-18

## 2022-02-19 LAB
24R-OH-CALCIDIOL SERPL-MCNC: 28 PG/ML
25(OH)D3 SERPL-MCNC: 62.7 NG/ML
ALBUMIN MFR SERPL ELPH: 58.1 %
ALBUMIN SERPL ELPH-MCNC: 4.6 G/DL
ALBUMIN SERPL-MCNC: 4.5 G/DL
ALBUMIN/GLOB SERPL: 1.4 RATIO
ALP BLD-CCNC: 75 U/L
ALPHA1 GLOB MFR SERPL ELPH: 4.4 %
ALPHA1 GLOB SERPL ELPH-MCNC: 0.3 G/DL
ALPHA2 GLOB MFR SERPL ELPH: 10.6 %
ALPHA2 GLOB SERPL ELPH-MCNC: 0.8 G/DL
ALT SERPL-CCNC: 14 U/L
ANION GAP SERPL CALC-SCNC: 12 MMOL/L
AST SERPL-CCNC: 21 U/L
B-GLOBULIN MFR SERPL ELPH: 9.1 %
B-GLOBULIN SERPL ELPH-MCNC: 0.7 G/DL
BILIRUB SERPL-MCNC: 0.3 MG/DL
BUN SERPL-MCNC: 30 MG/DL
CALCIUM SERPL-MCNC: 9.8 MG/DL
CALCIUM SERPL-MCNC: 9.8 MG/DL
CHLORIDE SERPL-SCNC: 102 MMOL/L
CO2 SERPL-SCNC: 29 MMOL/L
COLLAGEN NTX UR-SCNC: 308 NMOL BCE
COLLAGEN NTX/CREAT UR-SRTO: 31
CREAT SERPL-MCNC: 1.03 MG/DL
CREAT UR-MCNC: 113.3 MG/DL
GAMMA GLOB FLD ELPH-MCNC: 1.4 G/DL
GAMMA GLOB MFR SERPL ELPH: 17.8 %
GLUCOSE SERPL-MCNC: 106 MG/DL
INTERPRETATION SERPL IEP-IMP: NORMAL
INTERPRETIVE GUIDE:: NORMAL
M PROTEIN MFR SERPL ELPH: 13.7 %
MAGNESIUM SERPL-MCNC: 2.5 MG/DL
MONOCLON BAND OBS SERPL: 1.1 G/DL
PARATHYROID HORMONE INTACT: 34 PG/ML
PHOSPHATE SERPL-MCNC: 4.7 MG/DL
POTASSIUM SERPL-SCNC: 5.1 MMOL/L
PROT SERPL-MCNC: 7.7 G/DL
SODIUM SERPL-SCNC: 142 MMOL/L
TSH SERPL-ACNC: 2.11 UIU/ML

## 2022-02-23 ENCOUNTER — NON-APPOINTMENT (OUTPATIENT)
Age: 82
End: 2022-02-23

## 2022-02-23 ENCOUNTER — APPOINTMENT (OUTPATIENT)
Dept: ENDOCRINOLOGY | Facility: CLINIC | Age: 82
End: 2022-02-23
Payer: MEDICARE

## 2022-02-23 VITALS
HEIGHT: 65 IN | HEART RATE: 74 BPM | OXYGEN SATURATION: 95 % | SYSTOLIC BLOOD PRESSURE: 110 MMHG | BODY MASS INDEX: 23.82 KG/M2 | WEIGHT: 143 LBS | DIASTOLIC BLOOD PRESSURE: 60 MMHG

## 2022-02-23 DIAGNOSIS — E55.9 VITAMIN D DEFICIENCY, UNSPECIFIED: ICD-10-CM

## 2022-02-23 DIAGNOSIS — R77.8 OTHER SPECIFIED ABNORMALITIES OF PLASMA PROTEINS: ICD-10-CM

## 2022-02-23 PROCEDURE — 99215 OFFICE O/P EST HI 40 MIN: CPT

## 2022-02-27 PROBLEM — E55.9 VITAMIN D DEFICIENCY: Status: ACTIVE | Noted: 2019-02-18

## 2022-02-27 PROBLEM — R77.8 ABNORMAL SERUM PROTEIN ELECTROPHORESIS: Status: ACTIVE | Noted: 2022-02-27

## 2022-02-27 NOTE — HISTORY OF PRESENT ILLNESS
[FreeTextEntry1] : Ms. SANDER BLEVINS is a 81 year old female with Parkinson disease sent in a consult by Remedios Kohli for severe osteoporosis \par \par on Vit D 1,000 IU qd\par Calcium does not know the dose once a day\par severe osteoporosis on DEXA reviewed done 7/21 \par had a fall 4yrs ago broke her arm

## 2022-03-04 ENCOUNTER — APPOINTMENT (OUTPATIENT)
Dept: ENDOCRINOLOGY | Facility: CLINIC | Age: 82
End: 2022-03-04

## 2022-03-20 ENCOUNTER — RX RENEWAL (OUTPATIENT)
Age: 82
End: 2022-03-20

## 2022-03-24 ENCOUNTER — APPOINTMENT (OUTPATIENT)
Dept: CARDIOLOGY | Facility: CLINIC | Age: 82
End: 2022-03-24
Payer: MEDICARE

## 2022-03-24 ENCOUNTER — NON-APPOINTMENT (OUTPATIENT)
Age: 82
End: 2022-03-24

## 2022-03-24 ENCOUNTER — LABORATORY RESULT (OUTPATIENT)
Age: 82
End: 2022-03-24

## 2022-03-24 VITALS
WEIGHT: 135 LBS | BODY MASS INDEX: 22.77 KG/M2 | SYSTOLIC BLOOD PRESSURE: 140 MMHG | HEIGHT: 64.5 IN | DIASTOLIC BLOOD PRESSURE: 70 MMHG

## 2022-03-24 PROCEDURE — 36415 COLL VENOUS BLD VENIPUNCTURE: CPT

## 2022-03-24 PROCEDURE — 93000 ELECTROCARDIOGRAM COMPLETE: CPT

## 2022-03-24 PROCEDURE — 99214 OFFICE O/P EST MOD 30 MIN: CPT

## 2022-03-24 RX ORDER — TERIPARATIDE 250 UG/ML
620 INJECTION, SOLUTION SUBCUTANEOUS
Qty: 3 | Refills: 0 | Status: DISCONTINUED | COMMUNITY
Start: 2021-12-10 | End: 2022-03-24

## 2022-03-24 RX ORDER — TERIPARATIDE 250 UG/ML
620 INJECTION, SOLUTION SUBCUTANEOUS
Qty: 3 | Refills: 1 | Status: DISCONTINUED | COMMUNITY
Start: 2022-02-24 | End: 2022-03-24

## 2022-03-24 RX ORDER — RANITIDINE HYDROCHLORIDE 150 MG/1
150 CAPSULE ORAL
Refills: 0 | Status: DISCONTINUED | COMMUNITY
End: 2022-03-24

## 2022-03-24 NOTE — HISTORY OF PRESENT ILLNESS
[FreeTextEntry1] : 81 yo woman with  multiple medical issues, including:\par 1) Hx of lung cancer--s/p resection. Felt in remission\par 2) essential hypertension, on losartan and metoprolol tartrate 25\par 3) Hypothyroidism on levothyroxine\par 4) COPD\par 5) Hx of CAD\par \par The patient raised her family here in [place], retired to [place], but then came back to the area when her  passed away. She lives locally and has grown children and his sister.\par \par -In the past she was hospitalized for pneumonia and sepsis. She was found to have lung cancer and underwent successful lobectomy. The surgery was felt to be curative. \par -In 2017 she had a non-Q-wave MI. \par ----------------------------------------------------------------------------------------------------------------------------------\par Cardiac catheterization on April 5, 2017 revealed:\par -Only mild LAD disease.\par -Severe stenosis of OM -1.\par -Nonobstructive disease the right coronary artery.\par --LV systolic function was normal.\par She underwent successful stenting of the OM and has been clinically asymptomatic these last 3 years.\par ----------------------------------------------------------------------------------------------------------------------------------\par The patient was diagnosed as having dry macular degeneration in her left eye and 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 She is s/p  right vitrectomy on July 23, 2020.\par \par The patient is asymptomatic.\par She does not experience angina at all. No shortness of breath or dyspnea on exertion.\par No history of congestive heart failure. No orthopnea or PND. No peripheral edema.\par She denies knowledge of a heart murmur or rheumatic fever.\par \par The patient smoked as a young adult but not in the last 40+ years\par There is a history of alcohol abuse.\par The patient is , lives at [place], and has adult children that live locally.\par ====================================================================================\par ***(Echocardiogram (October 21, 2020):\par Normal LV size and contractility. EF 60%.\par Grade 1 diastolic dysfunction.\par Mild pulmonary hypertension with RVSP  46.1\par Mild left atrial enlargement. \par ***Moderate aortic valve calcification\par --With mild to moderate aortic stenosis(36.6 /18.6 mean and aortic valve area 0.93 cm square)\par Mitral valve thickening\par ====================================================================================\par Currently:  Had her two Covid shots and her neurologist increased propranolol LA from 60 to 80 mg daily.\par Saw neurology who  added primidone\par \par EKG (Today)\par Sinus  Rhythm \par nl axis\par LVH\par Nonspecific ST-T changes persist, as before\par NSC\par \par Labs from July 12, 2021:\par TSH and free T4--normal\par BUN 17, creatinine 0.78 and potassium 5.0.\par Cholesterol 211 with atrial 117 and LDL 81. Triglycerides 66\par CBC and urinalysis--both normal\par

## 2022-03-25 LAB
ALBUMIN SERPL ELPH-MCNC: 4.7 G/DL
ALP BLD-CCNC: 78 U/L
ALT SERPL-CCNC: 21 U/L
ANION GAP SERPL CALC-SCNC: 18 MMOL/L
AST SERPL-CCNC: 30 U/L
BASOPHILS # BLD AUTO: 0.06 K/UL
BASOPHILS NFR BLD AUTO: 1.5 %
BILIRUB SERPL-MCNC: 0.4 MG/DL
BUN SERPL-MCNC: 17 MG/DL
CALCIUM SERPL-MCNC: 9.5 MG/DL
CHLORIDE SERPL-SCNC: 104 MMOL/L
CHOLEST SERPL-MCNC: 287 MG/DL
CO2 SERPL-SCNC: 24 MMOL/L
CREAT SERPL-MCNC: 0.8 MG/DL
EGFR: 74 ML/MIN/1.73M2
EOSINOPHIL # BLD AUTO: 0.16 K/UL
EOSINOPHIL NFR BLD AUTO: 3.9 %
GLUCOSE SERPL-MCNC: 84 MG/DL
HCT VFR BLD CALC: 43.7 %
HDLC SERPL-MCNC: 139 MG/DL
HGB BLD-MCNC: 13.9 G/DL
IMM GRANULOCYTES NFR BLD AUTO: 0.2 %
LDLC SERPL CALC-MCNC: 131 MG/DL
LYMPHOCYTES # BLD AUTO: 1.03 K/UL
LYMPHOCYTES NFR BLD AUTO: 25.2 %
MAN DIFF?: NORMAL
MCHC RBC-ENTMCNC: 31.8 GM/DL
MCHC RBC-ENTMCNC: 34.2 PG
MCV RBC AUTO: 107.4 FL
MONOCYTES # BLD AUTO: 0.28 K/UL
MONOCYTES NFR BLD AUTO: 6.9 %
NEUTROPHILS # BLD AUTO: 2.54 K/UL
NEUTROPHILS NFR BLD AUTO: 62.3 %
NONHDLC SERPL-MCNC: 148 MG/DL
PLATELET # BLD AUTO: 190 K/UL
POTASSIUM SERPL-SCNC: 4.4 MMOL/L
PROT SERPL-MCNC: 7.6 G/DL
RBC # BLD: 4.07 M/UL
RBC # FLD: 14 %
SODIUM SERPL-SCNC: 146 MMOL/L
TRIGL SERPL-MCNC: 85 MG/DL
WBC # FLD AUTO: 4.08 K/UL

## 2022-04-06 ENCOUNTER — APPOINTMENT (OUTPATIENT)
Dept: NEUROLOGY | Facility: CLINIC | Age: 82
End: 2022-04-06
Payer: MEDICARE

## 2022-04-06 VITALS
BODY MASS INDEX: 22.77 KG/M2 | HEART RATE: 92 BPM | SYSTOLIC BLOOD PRESSURE: 135 MMHG | HEIGHT: 64.5 IN | WEIGHT: 135 LBS | DIASTOLIC BLOOD PRESSURE: 71 MMHG | TEMPERATURE: 97.6 F

## 2022-04-06 PROCEDURE — 99214 OFFICE O/P EST MOD 30 MIN: CPT

## 2022-04-06 NOTE — HISTORY OF PRESENT ILLNESS
[FreeTextEntry1] : Sharon Teresa is an 82 year old  Female with a PMHx of lung cancer, s/p MI, s/p lung resection on the right ~6 years ago, COPD, HLD, Depression, Hypothyroidism, HTN who presents for follow up in the Movement Disorders Clinic at [facility]. \par \par Interval history:\par Since the last visit, she reports that she has been doing okay. She is still getting therapy for walking. She thinks the tremors are about the same. No side effects since increasing the Primidone. She is able to eat better. \par When she is walking she feels. She denies back pain. She did have one fall and is now in therapy.\par \par Current meds:\par Propranolol 60mg daily\par Primidone 150mg at bedtime - she has noticed a benefit\par \par Initial history:\par She has had a left hand tremor for years. Her medication was changed to propranolol about a month ago, it has helped. Less tremor in the right hand. The tremors interfere with writing. No tremors in the head. \par \par Buttons are challenging. No trouble tying shoelaces or cutting food. No trouble turning or adjusting in bed at night. She walks with a cane. She is afraid of falling. She shuffles when walking. She has fallen in the past, last was 1.5 years ago. Once she fell stepping off a curb and broke her shoulder on the left. She fell out of bed when she was living in [place].\par \par No changes in sense of smell.\par Writing is bigger.\par Her children think she speaks louder.\par No trouble swallowing.\par No constipation, she has a daily bowel movement.\par No trouble with urination.\par She has a history of acting out dreams and she remembers her dreams. She sleeps well otherwise.\par No history of hallucinations. \par No dizziness when standing.\par \par Family history: her mother used to shuffle, no one else with tremors\par Social history: lives alone, minimal exercise for balance and leg strengthening

## 2022-04-06 NOTE — PHYSICAL EXAM
[Person] : oriented to person [Place] : oriented to place [Time] : oriented to time [Concentration Intact] : normal concentrating ability [Comprehension] : comprehension intact [Cranial Nerves Optic (II)] : visual acuity intact bilaterally,  visual fields full to confrontation, pupils equal round and reactive to light [Cranial Nerves Oculomotor (III)] : extraocular motion intact [Cranial Nerves Trigeminal (V)] : facial sensation intact symmetrically [Cranial Nerves Facial (VII)] : face symmetrical [Cranial Nerves Vestibulocochlear (VIII)] : hearing was intact bilaterally [Cranial Nerves Glossopharyngeal (IX)] : tongue and palate midline [Cranial Nerves Accessory (XI - Cranial And Spinal)] : head turning and shoulder shrug symmetric [Cranial Nerves Hypoglossal (XII)] : there was no tongue deviation with protrusion [Motor Strength] : muscle strength was normal in all four extremities [Sensation Tactile Decrease] : light touch was intact [1+] : Ankle jerk left 1+ [Paresis Pronator Drift Right-Sided] : no pronator drift on the right [Paresis Pronator Drift Left-Sided] : no pronator drift on the left [Coordination - Dysmetria Impaired Finger-to-Nose Bilateral] : not present [FreeTextEntry1] : Face is not significantly masked. EOMI, normal saccades. Voice is mildly hypophonic, mild vocal tremor. Mild mixed head tremor, slight.\par No significant resting tremors. Mild postural tremors of the left hand. Mild-moderate action tremors, left more than right. No leg tremors.\par No significant bradykinesia noted today.\par No significant rigidity.\par No trouble standing with arms crossed. Posture is mildly stooped. \par Gait is moderately wide based, shortened stride length, no shuffling, multistep turns. Re-emergent resting left hand tremor while walking. Decreased left arm swing. \par Postural reflexes are intact.

## 2022-04-06 NOTE — DISCUSSION/SUMMARY
[FreeTextEntry1] : Sharon Teresa is an 82 year old  Female with a PMHx of lung cancer, s/p MI, s/p lung resection on the right ~6 years ago, COPD, HLD, Depression, Hypothyroidism, HTN who presents for follow up in the Movement Disorders Clinic at [facility]. \par \par The patient's history is consistent with Essential tremor, likely Essential tremor plus with mild parkinsonian features on examination. The patient reports that she has had a good response to propranolol with regard to her tremors, but cannot titrate higher due to dizziness. She continues on propranolol for blood pressure control. Since adding Primidone 150mg at bedtime, she has noticed improvement in her tremors. She is not having as much difficulty eating. Her gait and balance are her main issue, likely multifactorial, but increasing primidone is not recommended as this can worsen her balance. ET patients can also have balance issues.\par \par Plan:\par - Continue Propranolol 60mg daily\par - Continue Primidone 150mg at bedtime.  \par - Encouraged to increase exercise and physical activity and maintain an active social and intellectual life.\par \par Patient would like to establish care with a provider closer to her home. Information provided. She can follow up with me PRN.\par \par All questions were answered to her satisfaction. I spent 30 minutes with this patient.

## 2022-05-16 ENCOUNTER — RX RENEWAL (OUTPATIENT)
Age: 82
End: 2022-05-16

## 2022-05-23 ENCOUNTER — RX RENEWAL (OUTPATIENT)
Age: 82
End: 2022-05-23

## 2022-06-08 ENCOUNTER — APPOINTMENT (OUTPATIENT)
Dept: FAMILY MEDICINE | Facility: CLINIC | Age: 82
End: 2022-06-08
Payer: MEDICARE

## 2022-06-08 VITALS
SYSTOLIC BLOOD PRESSURE: 118 MMHG | HEIGHT: 64.5 IN | BODY MASS INDEX: 22.94 KG/M2 | WEIGHT: 136 LBS | DIASTOLIC BLOOD PRESSURE: 66 MMHG | OXYGEN SATURATION: 95 % | HEART RATE: 70 BPM | TEMPERATURE: 99.7 F

## 2022-06-08 DIAGNOSIS — H35.30 UNSPECIFIED MACULAR DEGENERATION: ICD-10-CM

## 2022-06-08 PROCEDURE — 99215 OFFICE O/P EST HI 40 MIN: CPT

## 2022-06-13 PROBLEM — H35.30 MACULAR DEGENERATION: Status: ACTIVE | Noted: 2020-07-01

## 2022-06-13 NOTE — HISTORY OF PRESENT ILLNESS
[FreeTextEntry8] : 82 year old female with a PMH of lung cancer, s/p MI, s/p lung resection on the right ~6 years ago (follows with Dr. Frost), COPD, HLD, Depression, Hypothyroidism, HTN, essential tremor, parkinsonism, macular degeneration, presenting to [facility] care. Patient's previous pcp has retired. SHe is doing well and has no concerns today.

## 2022-06-13 NOTE — ASSESSMENT
[FreeTextEntry1] : 82 year old female with lung cancer s/p lung resection on the right ~6 years ago (follows with Dr. Frost), COPD, HLD, Depression, Hypothyroidism, HTN, essential tremor, parkinsonism (goes to movement clinic), macular degeneration (gets injection in the eye every 3 months) presented to establish care.\par Reviewed previous notes/outside records, labs, imaging.

## 2022-06-22 ENCOUNTER — APPOINTMENT (OUTPATIENT)
Dept: FAMILY MEDICINE | Facility: CLINIC | Age: 82
End: 2022-06-22

## 2022-06-23 ENCOUNTER — NON-APPOINTMENT (OUTPATIENT)
Age: 82
End: 2022-06-23

## 2022-07-01 ENCOUNTER — APPOINTMENT (OUTPATIENT)
Dept: ENDOCRINOLOGY | Facility: CLINIC | Age: 82
End: 2022-07-01

## 2022-07-13 ENCOUNTER — APPOINTMENT (OUTPATIENT)
Dept: FAMILY MEDICINE | Facility: CLINIC | Age: 82
End: 2022-07-13

## 2022-07-21 NOTE — HISTORY OF PRESENT ILLNESS
[FreeTextEntry1] : 81 yo woman with  multiple medical issues, including:\par 1) Hx of lung cancer--s/p resection. Felt in remission\par 2) essential hypertension, on losartan and metoprolol tartrate 25\par 3) Hypothyroidism on levothyroxine\par 4) COPD\par 5) Hx of CAD\par \par The patient raised her family here in [place], retired to [place], but then came back to the area when her  passed away. She lives locally and has grown children and his sister.\par \par -In the past she was hospitalized for pneumonia and sepsis. She was found to have lung cancer and underwent successful lobectomy. The surgery was felt to be curative. \par -In 2017 she had a non-Q-wave MI. \par ----------------------------------------------------------------------------------------------------------------------------------\par Cardiac catheterization on April 5, 2017 revealed:\par -Only mild LAD disease.\par -Severe stenosis of OM -1.\par -Nonobstructive disease the right coronary artery.\par --LV systolic function was normal.\par She underwent successful stenting of the OM and has been clinically asymptomatic these last 3 years.\par ----------------------------------------------------------------------------------------------------------------------------------\par The patient was diagnosed as having dry macular degeneration in her left eye and 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 She is s/p  right vitrectomy on July 23, 2020.\par \par The patient is asymptomatic.\par She does not experience angina at all. No shortness of breath or dyspnea on exertion.\par No history of congestive heart failure. No orthopnea or PND. No peripheral edema.\par She denies knowledge of a heart murmur or rheumatic fever.\par \par The patient smoked as a young adult but not in the last 40+ years\par There is a history of alcohol abuse.\par The patient is , lives at [place], and has adult children that live locally.\par ====================================================================================\par ***(Echocardiogram (October 21, 2020):\par Normal LV size and contractility. EF 60%.\par Grade 1 diastolic dysfunction.\par Mild pulmonary hypertension with RVSP  46.1\par Mild left atrial enlargement. \par ***Moderate aortic valve calcification\par --With mild to moderate aortic stenosis(36.6 /18.6 mean and aortic valve area 0.93 cm square)\par Mitral valve thickening\par ====================================================================================\par ****Echo Jan 2021: AV: 35/18.6=0.93 cm 2\par =========================================================================================\par Currently:  Had her two Covid shots and her neurologist increased propranolol LA from 60 to 80 mg daily.\par Saw neurology who  added primidone\par \par EKG (Today)\par Sinus  Rhythm \par nl axis\par LVH\par Nonspecific ST-T changes persist, as before\par NSC\par \par Labs from July 12, 2021:\par TSH and free T4--normal\par BUN 17, creatinine 0.78 and potassium 5.0.\par Cholesterol 211 with atrial 117 and LDL 81. Triglycerides 66\par CBC and urinalysis--both normal\par

## 2022-07-21 NOTE — ASSESSMENT
[FreeTextEntry1] : #1) known history of CAD with prior small non--STEMI, and subsequent cardiac catheterization revealing only branch disease of the OM. There was nonobstructive disease in the main circumflex, LAD and RCA. She is status post successful percutaneous revascularization with stenting. She is asymptomatic. No recent cardiovascular events. There were no contraindication to surgery. \par #2) history of essential hypertension--***BP Borderline low on losartan and Propranolol LA 60. Will reduce Losartan from 50 to 25 mg and consider d/c next visit\par #3) murmur and echo are compatible with mild to moderate aortic stenosis. This is asymptomatic. There is no indication for intervention in an  asymptomatic pt with  mild to moderate aortic stenosis. She will require periodic followup studies.\par --There is also moderate aortic insufficiency which is asymptomatic and associated with normal LV dimensions. No intervention is required here as well.\par ***Last Echo was Jan 2021--WILL PLAN ECHO for late 2022\par #4) history of lung cancer, status post resection. ?? Suspect curative\par #5) marked tremor, per neurology\par #6) history of substance abuse (alcohol).\par #7) Hyperlipidemia\par #8) Treated hypothyroidism--euthyroid\par \par NB: Needs to be on propranolol for BP, not just tremor.\par \par \par

## 2022-07-28 ENCOUNTER — APPOINTMENT (OUTPATIENT)
Dept: CARDIOLOGY | Facility: CLINIC | Age: 82
End: 2022-07-28

## 2022-08-10 ENCOUNTER — NON-APPOINTMENT (OUTPATIENT)
Age: 82
End: 2022-08-10

## 2022-11-08 ENCOUNTER — APPOINTMENT (OUTPATIENT)
Dept: FAMILY MEDICINE | Facility: CLINIC | Age: 82
End: 2022-11-08

## 2022-11-08 VITALS
HEIGHT: 64.5 IN | SYSTOLIC BLOOD PRESSURE: 120 MMHG | OXYGEN SATURATION: 97 % | HEART RATE: 65 BPM | TEMPERATURE: 98 F | BODY MASS INDEX: 22.94 KG/M2 | DIASTOLIC BLOOD PRESSURE: 70 MMHG | WEIGHT: 136 LBS

## 2022-11-08 DIAGNOSIS — F41.1 GENERALIZED ANXIETY DISORDER: ICD-10-CM

## 2022-11-08 DIAGNOSIS — E78.5 HYPERLIPIDEMIA, UNSPECIFIED: ICD-10-CM

## 2022-11-08 DIAGNOSIS — I10 ESSENTIAL (PRIMARY) HYPERTENSION: ICD-10-CM

## 2022-11-08 PROCEDURE — 99213 OFFICE O/P EST LOW 20 MIN: CPT | Mod: 25

## 2022-11-08 PROCEDURE — G0439: CPT

## 2022-11-08 PROCEDURE — 36415 COLL VENOUS BLD VENIPUNCTURE: CPT

## 2022-11-08 PROCEDURE — G0444 DEPRESSION SCREEN ANNUAL: CPT | Mod: 59

## 2022-11-08 RX ORDER — IPRATROPIUM BROMIDE AND ALBUTEROL SULFATE 2.5; .5 MG/3ML; MG/3ML
0.5-2.5 (3) SOLUTION RESPIRATORY (INHALATION) 3 TIMES DAILY
Qty: 3 | Refills: 1 | Status: COMPLETED | COMMUNITY
Start: 2021-07-12 | End: 2022-11-08

## 2022-11-08 RX ORDER — BUDESONIDE AND FORMOTEROL FUMARATE DIHYDRATE 160; 4.5 UG/1; UG/1
160-4.5 AEROSOL RESPIRATORY (INHALATION)
Refills: 0 | Status: COMPLETED | COMMUNITY
End: 2022-11-08

## 2022-11-08 RX ORDER — ALBUTEROL SULFATE 90 UG/1
INHALANT RESPIRATORY (INHALATION)
Refills: 0 | Status: ACTIVE | COMMUNITY

## 2022-11-08 NOTE — PHYSICAL EXAM
[Normal] : affect was normal and insight and judgment were intact [de-identified] : Tremors present; walks with a shuffle

## 2022-11-08 NOTE — ASSESSMENT
[FreeTextEntry1] : 82 year old female presented for an annual physical exam. \par routine blood work today, blood collected in the office.\par up to date with screening\par medical form completed and sent to [facility]. \par will call back with results.\par

## 2022-11-08 NOTE — HEALTH RISK ASSESSMENT
[Good] : ~his/her~  mood as  good [Never] : Never [No] : In the past 12 months have you used drugs other than those required for medical reasons? No [Any fall with injury in past year] : Patient reported fall with injury in the past year [Assistive Device] : Patient uses an assistive device [0] : 2) Feeling down, depressed, or hopeless: Not at all (0) [PHQ-2 Negative - No further assessment needed] : PHQ-2 Negative - No further assessment needed [HIV test declined] : HIV test declined [Hepatitis C test declined] : Hepatitis C test declined [None] : None [Alone] : lives alone [Retired] : retired [# Of Children ___] : has [unfilled] children [Fully functional (bathing, dressing, toileting, transferring, walking, feeding)] : Fully functional (bathing, dressing, toileting, transferring, walking, feeding) [Fully functional (using the telephone, shopping, preparing meals, housekeeping, doing laundry, using] : Fully functional and needs no help or supervision to perform IADLs (using the telephone, shopping, preparing meals, housekeeping, doing laundry, using transportation, managing medications and managing finances) [Patient reported bone density results were abnormal] : Patient reported bone density results were abnormal [de-identified] : not much [de-identified] : regular [de-identified] : 06/2022: hip replacement  [de-identified] : uses cane to walk. uses rollator at home  [[id number]] : 0 [Change in mental status noted] : No change in mental status noted [Reports changes in hearing] : Reports no changes in hearing [BoneDensityDate] : 12/21 [BoneDensityComments] : osteoporosis  [de-identified] : [facility]

## 2022-11-08 NOTE — HISTORY OF PRESENT ILLNESS
[FreeTextEntry1] : Annual physical  [de-identified] : 82 year old female with a PMH of lung cancer, s/p MI, s/p lung resection on the right ~6 years ago (follows with Dr. Frost), COPD, HLD, Depression, Hypothyroidism, HTN, essential tremor, parkinsonism, macular degeneration, left hip replacement s/p a fall in June 2022 presenting today with her daughter for an annual physical exam. Since the hip replacement, she is receiving physical therapy twice a week at home. \par Patient lives alone and is able to do most things for herself. She uses a cane/rollator at home and outside, has a shuffle with walking and is afraid of falling given recent fall 6 months ago. Her daughter worries about her taking shower alone because of the risk of falling while getting into and out of shower.  She drives locally. She has 4 children, 3 live close by and check up on her frequently. \par Patient brings medical form to be completed today.

## 2022-11-09 LAB
ALBUMIN SERPL ELPH-MCNC: 4.3 G/DL
ALP BLD-CCNC: 102 U/L
ALT SERPL-CCNC: 10 U/L
ANION GAP SERPL CALC-SCNC: 13 MMOL/L
AST SERPL-CCNC: 20 U/L
BASOPHILS # BLD AUTO: 0.1 K/UL
BASOPHILS NFR BLD AUTO: 1.6 %
BILIRUB SERPL-MCNC: 0.2 MG/DL
BUN SERPL-MCNC: 20 MG/DL
CALCIUM SERPL-MCNC: 9.8 MG/DL
CHLORIDE SERPL-SCNC: 104 MMOL/L
CHOLEST SERPL-MCNC: 229 MG/DL
CO2 SERPL-SCNC: 27 MMOL/L
CREAT SERPL-MCNC: 0.69 MG/DL
EGFR: 87 ML/MIN/1.73M2
EOSINOPHIL # BLD AUTO: 0.29 K/UL
EOSINOPHIL NFR BLD AUTO: 4.6 %
GLUCOSE SERPL-MCNC: 101 MG/DL
HCT VFR BLD CALC: 36.1 %
HDLC SERPL-MCNC: 101 MG/DL
HGB BLD-MCNC: 11.3 G/DL
IMM GRANULOCYTES NFR BLD AUTO: 0.3 %
LDLC SERPL CALC-MCNC: 113 MG/DL
LYMPHOCYTES # BLD AUTO: 1.26 K/UL
LYMPHOCYTES NFR BLD AUTO: 20.1 %
MAN DIFF?: NORMAL
MCHC RBC-ENTMCNC: 31 PG
MCHC RBC-ENTMCNC: 31.3 GM/DL
MCV RBC AUTO: 99.2 FL
MONOCYTES # BLD AUTO: 0.39 K/UL
MONOCYTES NFR BLD AUTO: 6.2 %
NEUTROPHILS # BLD AUTO: 4.21 K/UL
NEUTROPHILS NFR BLD AUTO: 67.2 %
NONHDLC SERPL-MCNC: 128 MG/DL
PLATELET # BLD AUTO: 242 K/UL
POTASSIUM SERPL-SCNC: 5.2 MMOL/L
PROT SERPL-MCNC: 7.3 G/DL
RBC # BLD: 3.64 M/UL
RBC # FLD: 13.9 %
SODIUM SERPL-SCNC: 144 MMOL/L
T4 FREE SERPL-MCNC: 1.9 NG/DL
TRIGL SERPL-MCNC: 72 MG/DL
TSH SERPL-ACNC: 2.17 UIU/ML
WBC # FLD AUTO: 6.27 K/UL

## 2022-12-01 PROBLEM — I35.1 AORTIC INSUFFICIENCY: Status: ACTIVE | Noted: 2022-12-01

## 2022-12-07 ENCOUNTER — APPOINTMENT (OUTPATIENT)
Dept: FAMILY MEDICINE | Facility: CLINIC | Age: 82
End: 2022-12-07

## 2022-12-08 ENCOUNTER — NON-APPOINTMENT (OUTPATIENT)
Age: 82
End: 2022-12-08

## 2022-12-08 ENCOUNTER — APPOINTMENT (OUTPATIENT)
Dept: CARDIOLOGY | Facility: CLINIC | Age: 82
End: 2022-12-08

## 2022-12-08 VITALS
BODY MASS INDEX: 23.22 KG/M2 | HEART RATE: 81 BPM | WEIGHT: 136 LBS | OXYGEN SATURATION: 95 % | DIASTOLIC BLOOD PRESSURE: 60 MMHG | HEIGHT: 64 IN | SYSTOLIC BLOOD PRESSURE: 120 MMHG | RESPIRATION RATE: 15 BRPM

## 2022-12-08 DIAGNOSIS — I35.1 NONRHEUMATIC AORTIC (VALVE) INSUFFICIENCY: ICD-10-CM

## 2022-12-08 PROCEDURE — 93000 ELECTROCARDIOGRAM COMPLETE: CPT

## 2022-12-08 PROCEDURE — 99214 OFFICE O/P EST MOD 30 MIN: CPT

## 2022-12-08 PROCEDURE — 36415 COLL VENOUS BLD VENIPUNCTURE: CPT

## 2022-12-08 NOTE — ASSESSMENT
[FreeTextEntry1] : #1) known history of CAD with prior small non--STEMI, and subsequent cardiac catheterization revealing only branch disease of the OM. There was nonobstructive disease in the main circumflex, LAD and RCA. She is status post successful percutaneous revascularization with stenting. She is asymptomatic. No recent cardiovascular events. There were no contraindication to surgery. \par #2) history of essential hypertension--***BP Borderline low on losartan and Propranolol LA 60. Will reduce Losartan from 50 to 25 mg and consider d/c next visit\par #3) murmur and echo are compatible with mild to moderate aortic stenosis. This is asymptomatic. There is no indication for intervention in an  asymptomatic pt with  mild to moderate aortic stenosis. She will require periodic followup studies.\par --There is also moderate aortic insufficiency which is asymptomatic and associated with normal LV dimensions. No intervention is required here as well.\par ***Last Echo was Jan 2021--\par ************* Will schedule echo before next visit\par \par \par #4) history of lung cancer, status post resection.--- Suspect curative\par #5) marked tremor, per neurology\par #6) history of substance abuse (alcohol).\par #7) Hyperlipidemia\par #8) Treated hypothyroidism--euthyroid\par \par NB: Needs to be on propranolol for BP, not just tremor.\par \par \par

## 2022-12-08 NOTE — HISTORY OF PRESENT ILLNESS
[FreeTextEntry1] : 83 yo woman with  multiple medical issues, including:\par 1) Hx of lung cancer--s/p resection. Felt in remission\par 2) essential hypertension, on losartan and metoprolol tartrate 25\par 3) Hypothyroidism on levothyroxine\par 4) COPD\par 5) Hx of CAD\par \par The patient raised her family here in [place], retired to [place], but then came back to the area when her  passed away. She lives locally and has grown children and his sister.\par \par -In the past she was hospitalized for pneumonia and sepsis. She was found to have lung cancer and underwent successful lobectomy. The surgery was felt to be curative. \par -In 2017 she had a non-Q-wave MI. \par ----------------------------------------------------------------------------------------------------------------------------------\par Cardiac catheterization on April 5, 2017 revealed:\par -Only mild LAD disease.\par -Severe stenosis of OM -1.\par -Nonobstructive disease the right coronary artery.\par --LV systolic function was normal.\par She underwent successful stenting of the OM and has been clinically asymptomatic these last 3 years.\par ----------------------------------------------------------------------------------------------------------------------------------\par The patient was diagnosed as having dry macular degeneration in her left eye and 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 She is s/p  right vitrectomy on July 23, 2020.\par \par The patient is asymptomatic.\par She does not experience angina at all. No shortness of breath or dyspnea on exertion.\par No history of congestive heart failure. No orthopnea or PND. No peripheral edema.\par She denies knowledge of a heart murmur or rheumatic fever.\par \par The patient smoked as a young adult but not in the last 40+ years\par There is a history of alcohol abuse.\par The patient is , lives at [place], and has adult children that live locally.\par ====================================================================================\par ***(Echocardiogram (October 21, 2020):\par Normal LV size and contractility. EF 60%.\par Grade 1 diastolic dysfunction.\par Mild pulmonary hypertension with RVSP  46.1\par Mild left atrial enlargement. \par ***Moderate aortic valve calcification\par --With mild to moderate aortic stenosis(36.6 /18.6 mean and aortic valve area 0.93 cm square)\par Mitral valve thickening\par ====================================================================================\par ********************Echo Jan 2021: AV: 35/18.6=0.93 cm 2**************************\par =========================================================================================\par Currently:  Had her two Covid shots and her neurologist increased propranolol LA from 60 to 80 mg daily.\par Saw neurology who  added primidone\par \par EKG (Today)\par Sinus  Rhythm \par nl axis\par LVH\par Nonspecific ST-T changes persist, as before\par NSC\par

## 2022-12-09 LAB
ALBUMIN SERPL ELPH-MCNC: 4.3 G/DL
ALP BLD-CCNC: 100 U/L
ALT SERPL-CCNC: 10 U/L
ANION GAP SERPL CALC-SCNC: 10 MMOL/L
AST SERPL-CCNC: 21 U/L
BASOPHILS # BLD AUTO: 0.06 K/UL
BASOPHILS NFR BLD AUTO: 1.1 %
BILIRUB SERPL-MCNC: 0.2 MG/DL
BUN SERPL-MCNC: 18 MG/DL
CALCIUM SERPL-MCNC: 9.7 MG/DL
CHLORIDE SERPL-SCNC: 101 MMOL/L
CHOLEST SERPL-MCNC: 263 MG/DL
CO2 SERPL-SCNC: 29 MMOL/L
CREAT SERPL-MCNC: 0.83 MG/DL
EGFR: 71 ML/MIN/1.73M2
EOSINOPHIL # BLD AUTO: 0.18 K/UL
EOSINOPHIL NFR BLD AUTO: 3.3 %
GLUCOSE SERPL-MCNC: 138 MG/DL
HCT VFR BLD CALC: 37 %
HDLC SERPL-MCNC: 139 MG/DL
HGB BLD-MCNC: 11.7 G/DL
IMM GRANULOCYTES NFR BLD AUTO: 0.2 %
LDLC SERPL CALC-MCNC: 108 MG/DL
LYMPHOCYTES # BLD AUTO: 1.39 K/UL
LYMPHOCYTES NFR BLD AUTO: 25.3 %
MAN DIFF?: NORMAL
MCHC RBC-ENTMCNC: 31.6 GM/DL
MCHC RBC-ENTMCNC: 32.2 PG
MCV RBC AUTO: 101.9 FL
MONOCYTES # BLD AUTO: 0.4 K/UL
MONOCYTES NFR BLD AUTO: 7.3 %
NEUTROPHILS # BLD AUTO: 3.45 K/UL
NEUTROPHILS NFR BLD AUTO: 62.8 %
NONHDLC SERPL-MCNC: 125 MG/DL
PLATELET # BLD AUTO: 213 K/UL
POTASSIUM SERPL-SCNC: 4.9 MMOL/L
PROT SERPL-MCNC: 7.5 G/DL
RBC # BLD: 3.63 M/UL
RBC # FLD: 16.5 %
SODIUM SERPL-SCNC: 140 MMOL/L
TRIGL SERPL-MCNC: 85 MG/DL
WBC # FLD AUTO: 5.49 K/UL

## 2022-12-14 ENCOUNTER — APPOINTMENT (OUTPATIENT)
Dept: FAMILY MEDICINE | Facility: CLINIC | Age: 82
End: 2022-12-14

## 2022-12-14 VITALS
HEIGHT: 64 IN | OXYGEN SATURATION: 95 % | TEMPERATURE: 97.6 F | DIASTOLIC BLOOD PRESSURE: 70 MMHG | SYSTOLIC BLOOD PRESSURE: 130 MMHG | WEIGHT: 136 LBS | BODY MASS INDEX: 23.22 KG/M2 | HEART RATE: 90 BPM

## 2022-12-14 PROCEDURE — 99213 OFFICE O/P EST LOW 20 MIN: CPT | Mod: 25

## 2022-12-14 PROCEDURE — 36415 COLL VENOUS BLD VENIPUNCTURE: CPT

## 2022-12-18 NOTE — HISTORY OF PRESENT ILLNESS
[FreeTextEntry8] : 82 year old female with hypothyroidism on levothyroxine 75mcg with abnormal thyroid labs in november presenting for a follow up. Patient is doing well and has no complaints today.

## 2022-12-19 LAB
T4 FREE SERPL-MCNC: 1.7 NG/DL
TSH SERPL-ACNC: 1.6 UIU/ML

## 2023-01-03 RX ORDER — PANTOPRAZOLE 40 MG/1
40 TABLET, DELAYED RELEASE ORAL
Qty: 90 | Refills: 0 | Status: ACTIVE | COMMUNITY
Start: 1900-01-01 | End: 1900-01-01

## 2023-03-27 ENCOUNTER — RESULT REVIEW (OUTPATIENT)
Age: 83
End: 2023-03-27

## 2023-04-06 NOTE — HISTORY OF PRESENT ILLNESS
[FreeTextEntry1] : 81 yo woman with  multiple medical issues, including:\par 1) Hx of lung cancer--s/p resection. Felt in remission\par 2) essential hypertension, on losartan and metoprolol tartrate 25\par 3) Hypothyroidism on levothyroxine\par 4) COPD\par 5) Hx of CAD\par \par The patient raised her family here in [place], retired to [place], but then came back to the area when her  passed away. She lives locally and has grown children and his sister.\par \par -In the past she was hospitalized for pneumonia and sepsis. She was found to have lung cancer and underwent successful lobectomy. The surgery was felt to be curative. \par -In 2017 she had a non-Q-wave MI. \par ----------------------------------------------------------------------------------------------------------------------------------\par Cardiac catheterization on April 5, 2017 revealed:\par -Only mild LAD disease.\par -Severe stenosis of OM -1.\par -Nonobstructive disease the right coronary artery.\par --LV systolic function was normal.\par She underwent successful stenting of the OM and has been clinically asymptomatic these last 3 years.\par ----------------------------------------------------------------------------------------------------------------------------------\par The patient was diagnosed as having dry macular degeneration in her left eye and 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 She is s/p  right vitrectomy on July 23, 2020.\par \par The patient is asymptomatic.\par She does not experience angina at all. No shortness of breath or dyspnea on exertion.\par No history of congestive heart failure. No orthopnea or PND. No peripheral edema.\par She denies knowledge of a heart murmur or rheumatic fever.\par \par The patient smoked as a young adult but not in the last 40+ years\par There is a history of alcohol abuse.\par The patient is , lives at [facility], and has adult children that live locally.\par ====================================================================================\par ***(Echocardiogram (October 21, 2020):\par Normal LV size and contractility. EF 60%.\par Grade 1 diastolic dysfunction.\par Mild pulmonary hypertension with RVSP  46.1\par Mild left atrial enlargement. \par ***Moderate aortic valve calcification\par --With mild to moderate aortic stenosis(36.6 /18.6 mean and aortic valve area 0.93 cm square)\par Mitral valve thickening\par ===============================================================================\par Echo Jan 2021:                  AV: 35/18.    0.93 cm 2\par Current ECho (3/27/2023): AV 27/26      0.80 cm2     DVI 0.23\par \par =========================================================================================\par \par Currently:  Had her two Covid shots and her neurologist increased propranolol LA from 60 to 80 mg daily.\par Saw neurology who  added primidone\par \par EKG (Today)\par Sinus  Rhythm \par nl axis\par LVH\par Nonspecific ST-T changes persist, as before\par NSC\par

## 2023-04-06 NOTE — REASON FOR VISIT
[FreeTextEntry1] : \par Known hx of CAD--s/p OM stent--on ASA\par COPD\par Hypertension--on propranolol and losartan\par Hyperlipidemia--on atorvastatin\par Treated hypothyroidism--on levothyroxine\par Tremor, on Propranolol\par Aortic stenosis-- moderate

## 2023-04-06 NOTE — PHYSICAL EXAM
[General Appearance - Well Developed] : well developed [Normal Appearance] : normal appearance [Well Groomed] : well groomed [General Appearance - Well Nourished] : well nourished [No Deformities] : no deformities [General Appearance - In No Acute Distress] : no acute distress [Normal Conjunctiva] : the conjunctiva exhibited no abnormalities [Eyelids - No Xanthelasma] : the eyelids demonstrated no xanthelasmas [Normal Oral Mucosa] : normal oral mucosa [No Oral Pallor] : no oral pallor [No Oral Cyanosis] : no oral cyanosis [Normal Jugular Venous A Waves Present] : normal jugular venous A waves present [Normal Jugular Venous V Waves Present] : normal jugular venous V waves present [No Jugular Venous Hernandez A Waves] : no jugular venous hernandez A waves [Respiration, Rhythm And Depth] : normal respiratory rhythm and effort [Exaggerated Use Of Accessory Muscles For Inspiration] : no accessory muscle use [Auscultation Breath Sounds / Voice Sounds] : lungs were clear to auscultation bilaterally [Abdomen Soft] : soft [Abdomen Tenderness] : non-tender [Abdomen Mass (___ Cm)] : no abdominal mass palpated [Abnormal Walk] : normal gait [Gait - Sufficient For Exercise Testing] : the gait was sufficient for exercise testing [Nail Clubbing] : no clubbing of the fingernails [Cyanosis, Localized] : no localized cyanosis [Petechial Hemorrhages (___cm)] : no petechial hemorrhages [Skin Color & Pigmentation] : normal skin color and pigmentation [] : no rash [No Venous Stasis] : no venous stasis [Skin Lesions] : no skin lesions [No Skin Ulcers] : no skin ulcer [No Xanthoma] : no  xanthoma was observed [Oriented To Time, Place, And Person] : oriented to person, place, and time [Affect] : the affect was normal [Mood] : the mood was normal [No Anxiety] : not feeling anxious [5th Left ICS - MCL] : palpated at the 5th LICS in the midclavicular line [Normal] : normal [Normal Rate] : normal [Rhythm Regular] : regular [Normal S1] : normal S1 [Normal S2] : normal S2 [II] : a grade 2 [Right Carotid Bruit] : no bruit heard over the right carotid [Left Carotid Bruit] : no bruit heard over the left carotid [Right Femoral Bruit] : no bruit heard over the right femoral artery [Left Femoral Bruit] : no bruit heard over the left femoral artery [No Abnormalities] : the abdominal aorta was not enlarged and no bruit was heard

## 2023-04-07 ENCOUNTER — APPOINTMENT (OUTPATIENT)
Dept: FAMILY MEDICINE | Facility: CLINIC | Age: 83
End: 2023-04-07
Payer: MEDICARE

## 2023-04-07 VITALS
OXYGEN SATURATION: 98 % | BODY MASS INDEX: 23.39 KG/M2 | SYSTOLIC BLOOD PRESSURE: 120 MMHG | HEART RATE: 56 BPM | TEMPERATURE: 98 F | DIASTOLIC BLOOD PRESSURE: 80 MMHG | WEIGHT: 137 LBS | HEIGHT: 64 IN

## 2023-04-07 DIAGNOSIS — T14.8XXA OTHER INJURY OF UNSPECIFIED BODY REGION, INITIAL ENCOUNTER: ICD-10-CM

## 2023-04-07 PROCEDURE — 99213 OFFICE O/P EST LOW 20 MIN: CPT

## 2023-04-13 ENCOUNTER — APPOINTMENT (OUTPATIENT)
Dept: CARDIOLOGY | Facility: CLINIC | Age: 83
End: 2023-04-13
Payer: MEDICARE

## 2023-04-14 PROBLEM — T14.8XXA WOUND OF SKIN: Status: ACTIVE | Noted: 2023-04-07

## 2023-04-14 NOTE — HISTORY OF PRESENT ILLNESS
[FreeTextEntry8] : 83 year old female with a PMH of lung cancer, s/p MI, s/p lung resection on the right ~6 years ago (follows with Dr. Frost), COPD, HLD, Depression, Hypothyroidism, HTN, essential tremor, parkinsonism, macular degeneration, left hip replacement s/p a fall in June 2022 presenting today with a sore on her neck on the right side for a month. Open sore with no discharge. \par She is also requesting a script for a small folding walker as the one she has is difficult to use.

## 2023-04-14 NOTE — PHYSICAL EXAM
[Normal] : normal rate, regular rhythm, normal S1 and S2 and no murmur heard [de-identified] : small open sore with no discharge on the right side of the neck

## 2023-04-28 ENCOUNTER — APPOINTMENT (OUTPATIENT)
Dept: ENDOCRINOLOGY | Facility: CLINIC | Age: 83
End: 2023-04-28

## 2023-05-01 ENCOUNTER — NON-APPOINTMENT (OUTPATIENT)
Age: 83
End: 2023-05-01

## 2023-05-01 ENCOUNTER — APPOINTMENT (OUTPATIENT)
Dept: CARDIOLOGY | Facility: CLINIC | Age: 83
End: 2023-05-01
Payer: MEDICARE

## 2023-05-01 ENCOUNTER — LABORATORY RESULT (OUTPATIENT)
Age: 83
End: 2023-05-01

## 2023-05-01 VITALS
OXYGEN SATURATION: 97 % | DIASTOLIC BLOOD PRESSURE: 78 MMHG | HEART RATE: 56 BPM | SYSTOLIC BLOOD PRESSURE: 124 MMHG | BODY MASS INDEX: 23.52 KG/M2 | WEIGHT: 137 LBS

## 2023-05-01 DIAGNOSIS — Z00.00 ENCOUNTER FOR GENERAL ADULT MEDICAL EXAMINATION W/OUT ABNORMAL FINDINGS: ICD-10-CM

## 2023-05-01 PROCEDURE — 99214 OFFICE O/P EST MOD 30 MIN: CPT | Mod: 25

## 2023-05-01 PROCEDURE — 93000 ELECTROCARDIOGRAM COMPLETE: CPT

## 2023-05-01 PROCEDURE — 36415 COLL VENOUS BLD VENIPUNCTURE: CPT

## 2023-05-01 NOTE — HISTORY OF PRESENT ILLNESS
[FreeTextEntry1] : 82 yo woman with  multiple medical issues, including:\par 1) Hx of lung cancer--s/p resection. Felt in remission\par 2) essential hypertension, on losartan and metoprolol tartrate 25\par 3) Hypothyroidism on levothyroxine\par 4) COPD\par 5) Hx of CAD\par \par The patient raised her family here in [place], retired to [place], but then came back to the area when her  passed away. She lives locally and has grown children and his sister.\par \par -In the past she was hospitalized for pneumonia and sepsis. She was found to have lung cancer and underwent successful lobectomy. The surgery was felt to be curative. \par -In 2017 she had a non-Q-wave MI. \par ----------------------------------------------------------------------------------------------------------------------------------\par Cardiac catheterization on April 5, 2017 revealed:\par -Only mild LAD disease.\par -Severe stenosis of OM -1.\par -Nonobstructive disease the right coronary artery.\par --LV systolic function was normal.\par She underwent successful stenting of the OM and has been clinically asymptomatic these last 3 years.\par ----------------------------------------------------------------------------------------------------------------------------------\par The patient was diagnosed as having dry macular degeneration in her left eye and 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 She is s/p  right vitrectomy on July 23, 2020.\par \par The patient is asymptomatic.\par She does not experience angina at all. No shortness of breath or dyspnea on exertion.\par No history of congestive heart failure. No orthopnea or PND. No peripheral edema.\par She denies knowledge of a heart murmur or rheumatic fever.\par \par The patient smoked as a young adult but not in the last 40+ years\par There is a history of alcohol abuse.\par The patient is , lives at [facility], and has adult children that live locally.\par ====================================================================================\par ***(Echocardiogram (March 28, 2023):\par Normal LV size WITH MILD GLOBAL HYPOKINESIS  and EF 55%    \par Mild  Conc LVH\par Grade 1 diastolic dysfunction.\par Mild pulmonary hypertension with RVSP  46.1\par Mild left atrial enlargement. \par ***Moderate aortic valve calcification with AS: \par --Jan 2021:      mild to moderate aortic stenosis(36.6 /18.6 mean and aortic valve area 0.93 cm square)\par -March 2023:    Moderate AS                              (27/16       TYLER 0.80   DVI 0.23)\par Mitral valve thickening\par ===============================================================================\par Echo Jan 2021:                  AV: 35/18.    0.93 cm 2\par Current ECho (3/27/2023): AV 27/26      0.80 cm2     DVI 0.23\par \par =========================================================================================\par \par Currently:  Had her two Covid shots and her neurologist increased propranolol LA from 60 to 80 mg daily, and who  added primidone\par \par EKG (Today)\par Sinus  Rhythm \par nl axis\par LVH\par Nonspecific ST-T changes persist, as before\par NSC\par

## 2023-05-01 NOTE — ASSESSMENT
[FreeTextEntry1] : #1) known history of CAD with prior small non--STEMI, and subsequent cardiac catheterization revealing only branch disease of the OM. There was nonobstructive disease in the main circumflex, LAD and RCA. She is status post successful percutaneous revascularization with stenting. She is asymptomatic. No recent cardiovascular events. There were no contraindication to surgery. \par #2) history of essential hypertension--on  Propranolol LA 80 and now lower dose Losartan  25 mg and consider d/c next visit\par \par #3)  Aortic stenosis--now moderate to severe but not critical.  She is asymptomatic and the recent echocardiogram shows minimal if any progression.  Hence in the absence of symptoms and without progression we will continue to observe.  She will require follow-up echocardiography in 6 to 12 months.  The patient is aware that she will eventually require TAVR.\par --There is also moderate aortic insufficiency which is asymptomatic and associated with normal LV dimensions. No intervention is required here as well.\par \par #4) history of lung cancer, status post resection.--- Suspect curative\par #5) marked tremor, per neurology\par #6) history of substance abuse (alcohol).\par #7) Hyperlipidemia\par #8) Treated hypothyroidism--euthyroid\par \par NB: Needs to be on propranolol for BP, not just tremor.\par Additional comment: This is a very frail individual with a history of lung cancer, coronary disease, and significant COPD.  She also has a very marked tremor which is disabling.  She has developed moderately severe aortic stenosis which may eventually require TAVR.  This will have to be a difficult decision involving the structural heart team to assess the risks and benefits in this high risk individual for any surgical procedure.\par \par \par

## 2023-05-02 LAB
ALBUMIN SERPL ELPH-MCNC: 3.8 G/DL
ALP BLD-CCNC: 112 U/L
ALT SERPL-CCNC: 14 U/L
ANION GAP SERPL CALC-SCNC: 10 MMOL/L
AST SERPL-CCNC: 19 U/L
BASOPHILS # BLD AUTO: 0.05 K/UL
BASOPHILS NFR BLD AUTO: 0.6 %
BILIRUB SERPL-MCNC: 0.2 MG/DL
BUN SERPL-MCNC: 15 MG/DL
CALCIUM SERPL-MCNC: 9.3 MG/DL
CHLORIDE SERPL-SCNC: 101 MMOL/L
CHOLEST SERPL-MCNC: 159 MG/DL
CO2 SERPL-SCNC: 29 MMOL/L
CREAT SERPL-MCNC: 0.68 MG/DL
EGFR: 87 ML/MIN/1.73M2
EOSINOPHIL # BLD AUTO: 0.18 K/UL
EOSINOPHIL NFR BLD AUTO: 2.2 %
GLUCOSE SERPL-MCNC: 120 MG/DL
HCT VFR BLD CALC: 34.3 %
HDLC SERPL-MCNC: 68 MG/DL
HGB BLD-MCNC: 10.9 G/DL
IMM GRANULOCYTES NFR BLD AUTO: 0.2 %
LDLC SERPL CALC-MCNC: 73 MG/DL
LYMPHOCYTES # BLD AUTO: 1.37 K/UL
LYMPHOCYTES NFR BLD AUTO: 17 %
MAN DIFF?: NORMAL
MCHC RBC-ENTMCNC: 31.8 GM/DL
MCHC RBC-ENTMCNC: 33.2 PG
MCV RBC AUTO: 104.6 FL
MONOCYTES # BLD AUTO: 0.53 K/UL
MONOCYTES NFR BLD AUTO: 6.6 %
NEUTROPHILS # BLD AUTO: 5.92 K/UL
NEUTROPHILS NFR BLD AUTO: 73.4 %
NONHDLC SERPL-MCNC: 92 MG/DL
PLATELET # BLD AUTO: 319 K/UL
POTASSIUM SERPL-SCNC: 5.2 MMOL/L
PROT SERPL-MCNC: 7 G/DL
RBC # BLD: 3.28 M/UL
RBC # FLD: 13.6 %
SODIUM SERPL-SCNC: 140 MMOL/L
TRIGL SERPL-MCNC: 91 MG/DL
TSH SERPL-ACNC: 5.03 UIU/ML
WBC # FLD AUTO: 8.07 K/UL

## 2023-05-13 ENCOUNTER — RX RENEWAL (OUTPATIENT)
Age: 83
End: 2023-05-13

## 2023-05-24 ENCOUNTER — RX RENEWAL (OUTPATIENT)
Age: 83
End: 2023-05-24

## 2023-05-31 ENCOUNTER — NON-APPOINTMENT (OUTPATIENT)
Age: 83
End: 2023-05-31

## 2023-06-02 ENCOUNTER — APPOINTMENT (OUTPATIENT)
Dept: FAMILY MEDICINE | Facility: CLINIC | Age: 83
End: 2023-06-02
Payer: MEDICARE

## 2023-06-02 VITALS
DIASTOLIC BLOOD PRESSURE: 80 MMHG | HEIGHT: 64 IN | HEART RATE: 80 BPM | TEMPERATURE: 98.8 F | OXYGEN SATURATION: 98 % | BODY MASS INDEX: 23.22 KG/M2 | SYSTOLIC BLOOD PRESSURE: 120 MMHG | WEIGHT: 136 LBS

## 2023-06-02 DIAGNOSIS — R35.0 FREQUENCY OF MICTURITION: ICD-10-CM

## 2023-06-02 LAB
BILIRUB UR QL STRIP: NEGATIVE
CLARITY UR: CLEAR
COLLECTION METHOD: NORMAL
GLUCOSE UR-MCNC: NEGATIVE
HCG UR QL: 0.2 EU/DL
HGB UR QL STRIP.AUTO: NEGATIVE
KETONES UR-MCNC: NEGATIVE
LEUKOCYTE ESTERASE UR QL STRIP: NEGATIVE
NITRITE UR QL STRIP: NEGATIVE
PH UR STRIP: 7
PROT UR STRIP-MCNC: NEGATIVE
SP GR UR STRIP: 1.02

## 2023-06-02 PROCEDURE — 99213 OFFICE O/P EST LOW 20 MIN: CPT | Mod: 25

## 2023-06-02 PROCEDURE — 81003 URINALYSIS AUTO W/O SCOPE: CPT | Mod: QW

## 2023-06-04 ENCOUNTER — NON-APPOINTMENT (OUTPATIENT)
Age: 83
End: 2023-06-04

## 2023-06-05 ENCOUNTER — NON-APPOINTMENT (OUTPATIENT)
Age: 83
End: 2023-06-05

## 2023-06-06 PROBLEM — R35.0 URINE FREQUENCY: Status: ACTIVE | Noted: 2020-07-02

## 2023-06-06 LAB — BACTERIA UR CULT: NORMAL

## 2023-06-06 NOTE — HISTORY OF PRESENT ILLNESS
[FreeTextEntry8] : 83 year old female with a PMH of lung cancer, s/p MI, s/p lung resection on the right ~6 years ago (follows with Dr. Frost), COPD, HLD, Depression, Hypothyroidism, HTN, essential tremor, parkinsonism, macular degeneration, left hip replacement s/p a fall in June 2022 presenting today to have urine checked. Patient's daughter reports her memory is worsening; she had a HHA for 4hrs a day. She was recommended to have urine check to make sure memory loss isn't due to UTI. Patient was following with neurologist, Dr. Cunha in the past, last seen 04/2022 and hasn't been back for memory loss. she denies any urinary symptoms at this time.  [Family Member] : family member

## 2023-06-06 NOTE — ASSESSMENT
[FreeTextEntry1] : urine dip - negative; will send for culture\par hx of parkinsonism, likely progressing hence worsening memory loss. \par recommended to f/u with neurologist.

## 2023-06-09 ENCOUNTER — APPOINTMENT (OUTPATIENT)
Dept: ENDOCRINOLOGY | Facility: CLINIC | Age: 83
End: 2023-06-09
Payer: MEDICARE

## 2023-06-09 VITALS
DIASTOLIC BLOOD PRESSURE: 78 MMHG | HEIGHT: 63.5 IN | WEIGHT: 136 LBS | OXYGEN SATURATION: 96 % | HEART RATE: 70 BPM | SYSTOLIC BLOOD PRESSURE: 120 MMHG | BODY MASS INDEX: 23.8 KG/M2

## 2023-06-09 DIAGNOSIS — M81.0 AGE-RELATED OSTEOPOROSIS W/OUT CURRENT PATHOLOGICAL FRACTURE: ICD-10-CM

## 2023-06-09 LAB
24R-OH-CALCIDIOL SERPL-MCNC: 26.3 PG/ML
25(OH)D3 SERPL-MCNC: 56.1 NG/ML
ALBUMIN SERPL ELPH-MCNC: 4.2 G/DL
ALP BLD-CCNC: 93 U/L
ALT SERPL-CCNC: 8 U/L
ANION GAP SERPL CALC-SCNC: 11 MMOL/L
AST SERPL-CCNC: 18 U/L
BILIRUB SERPL-MCNC: 0.2 MG/DL
BUN SERPL-MCNC: 25 MG/DL
CALCIUM SERPL-MCNC: 9.4 MG/DL
CALCIUM SERPL-MCNC: 9.4 MG/DL
CHLORIDE SERPL-SCNC: 97 MMOL/L
CO2 SERPL-SCNC: 29 MMOL/L
COLLAGEN CTX SERPL-MCNC: 528 PG/ML
CREAT SERPL-MCNC: 0.93 MG/DL
EGFR: 61 ML/MIN/1.73M2
GLUCOSE SERPL-MCNC: 111 MG/DL
MAGNESIUM SERPL-MCNC: 2.4 MG/DL
OSTEOCALCIN SERPL-MCNC: 9.7 NG/ML
PARATHYROID HORMONE INTACT: 30 PG/ML
POTASSIUM SERPL-SCNC: 5.8 MMOL/L
PROT SERPL-MCNC: 7.4 G/DL
SODIUM SERPL-SCNC: 137 MMOL/L
TSH SERPL-ACNC: 4.78 UIU/ML

## 2023-06-09 PROCEDURE — 99215 OFFICE O/P EST HI 40 MIN: CPT

## 2023-06-09 RX ORDER — ZOLEDRONIC ACID 5 MG/100ML
5 INJECTION, SOLUTION INTRAVENOUS
Qty: 1 | Refills: 0 | Status: ACTIVE | COMMUNITY
Start: 2023-06-09

## 2023-06-09 RX ORDER — SILVER SULFADIAZINE 10 MG/G
1 CREAM TOPICAL TWICE DAILY
Qty: 1 | Refills: 2 | Status: DISCONTINUED | COMMUNITY
Start: 2023-04-07 | End: 2023-06-09

## 2023-06-14 ENCOUNTER — NON-APPOINTMENT (OUTPATIENT)
Age: 83
End: 2023-06-14

## 2023-06-15 ENCOUNTER — NON-APPOINTMENT (OUTPATIENT)
Age: 83
End: 2023-06-15

## 2023-06-21 ENCOUNTER — APPOINTMENT (OUTPATIENT)
Dept: NEUROLOGY | Facility: CLINIC | Age: 83
End: 2023-06-21
Payer: MEDICARE

## 2023-06-21 VITALS
WEIGHT: 133 LBS | HEART RATE: 67 BPM | HEIGHT: 63.5 IN | DIASTOLIC BLOOD PRESSURE: 65 MMHG | SYSTOLIC BLOOD PRESSURE: 118 MMHG | BODY MASS INDEX: 23.27 KG/M2

## 2023-06-21 DIAGNOSIS — Z74.09 OTHER REDUCED MOBILITY: ICD-10-CM

## 2023-06-21 DIAGNOSIS — G20 PARKINSON'S DISEASE: ICD-10-CM

## 2023-06-21 PROCEDURE — 99214 OFFICE O/P EST MOD 30 MIN: CPT

## 2023-06-21 RX ORDER — PRIMIDONE 50 MG/1
50 TABLET ORAL
Qty: 540 | Refills: 3 | Status: ACTIVE | COMMUNITY
Start: 2021-07-21 | End: 1900-01-01

## 2023-06-21 NOTE — DISCUSSION/SUMMARY
[FreeTextEntry1] : Sharon Teresa is an 83 year old  Female with a PMHx of lung cancer, s/p MI, s/p lung resection on the right ~6 years ago, COPD, HLD, Depression, Hypothyroidism, HTN who presents for follow up in the Movement Disorders Clinic at [facility] for Essential Tremors. She is accompanied by her daughter. \par \par The patient's history is consistent with Essential tremor, likely Essential tremor plus with mild parkinsonian features on examination. The patient reports that she has had a good response to propranolol with regard to her tremors, but cannot titrate higher due to dizziness. She continues on propranolol for blood pressure control. Since adding Primidone 150mg at bedtime, she has noticed improvement in her tremors, but still with significant difficulty. Her gait and balance are her main issue, likely multifactorial. ET patients can also have balance issues.\par Discussed the benefits and side effects of increasing medications.\par \par Plan:\par - Continue Propranolol 60mg daily\par - Trial of increasing Primidone from 150mg to 200mg at bedtime. Can also split dose to 150mg at bedtime and 50mg in the daytime. Monitor for drowsiness and worsening balance\par - Discussed potential PD medications for further tremor control\par - She is interested in referral for possible HiFU  \par - Encouraged to increase exercise and physical activity and maintain an active social and intellectual life.\par - Referral for PT\par \par RTC 6 months\par \par All questions were answered to her satisfaction. I spent 30 minutes with this patient.

## 2023-06-21 NOTE — PHYSICAL EXAM
[Person] : oriented to person [Place] : oriented to place [Time] : oriented to time [Comprehension] : comprehension intact [Cranial Nerves Optic (II)] : visual acuity intact bilaterally,  visual fields full to confrontation, pupils equal round and reactive to light [Cranial Nerves Oculomotor (III)] : extraocular motion intact [Cranial Nerves Trigeminal (V)] : facial sensation intact symmetrically [Cranial Nerves Facial (VII)] : face symmetrical [Cranial Nerves Vestibulocochlear (VIII)] : hearing was intact bilaterally [Cranial Nerves Glossopharyngeal (IX)] : tongue and palate midline [Cranial Nerves Accessory (XI - Cranial And Spinal)] : head turning and shoulder shrug symmetric [Cranial Nerves Hypoglossal (XII)] : there was no tongue deviation with protrusion [Motor Strength] : muscle strength was normal in all four extremities [Sensation Tactile Decrease] : light touch was intact [1+] : Ankle jerk left 1+ [Concentration Intact] : a decrease in concentrating ability was observed [Paresis Pronator Drift Right-Sided] : no pronator drift on the right [Paresis Pronator Drift Left-Sided] : no pronator drift on the left [Coordination - Dysmetria Impaired Finger-to-Nose Bilateral] : not present [FreeTextEntry1] : Face is not significantly masked. EOMI, normal saccades. Voice is with good volume, mild vocal tremor. Mild jaw tremor.\par mild-moderate resting tremors of the left hand more than right. Mild-moderate postural tremors of the left hand. Mild-moderate action tremors, left more than right. No leg tremors.\par Mild bradykinesia on the left.\par No significant rigidity.\par Took multiple attempts to stand with arms crossed. Posture is mildly stooped. \par Gait is moderately wide based, shortened stride length, no shuffling, multistep turns. Re-emergent resting left hand tremor while walking. Decreased left arm swing. \par Postural reflexes are intact.

## 2023-06-21 NOTE — HISTORY OF PRESENT ILLNESS
[FreeTextEntry1] : Sharon Teresa is an 83 year old  Female with a PMHx of lung cancer, s/p MI, s/p lung resection on the right ~6 years ago, COPD, HLD, Depression, Hypothyroidism, HTN who presents for follow up in the Movement Disorders Clinic at [facility] for Essential Tremors. She is accompanied by her daughter. \par \par Interval history:\par Since the last visit, she had an episode of confusion x 1 day, then it resolved. Her memory seems to be getting worse, short term mostly. Sometimes long term memory is affected. The tremors are pretty good. Sometimes her right arm starts shaking more than before. She is dropping things and spilling things. She has had to adjust things when holding things. She has not noticed any side effects. Sometimes she does not sleep through the night. She is awoken if she has to go to the bathroom. \par Balance and walking are stable. She uses the walker. She is looking for a PT at home. \par No constipation.\par No dizziness/lightheadedness when standing. \par She has always acted out dreams. \par She is still driving locally.\par \par Current meds:\par Propranolol 60mg daily\par Primidone 150mg at bedtime  \par \par Initial history:\par She has had a left hand tremor for years. Her medication was changed to propranolol about a month ago, it has helped. Less tremor in the right hand. The tremors interfere with writing. No tremors in the head. \par \par Buttons are challenging. No trouble tying shoelaces or cutting food. No trouble turning or adjusting in bed at night. She walks with a cane. She is afraid of falling. She shuffles when walking. She has fallen in the past, last was 1.5 years ago. Once she fell stepping off a curb and broke her shoulder on the left. She fell out of bed when she was living in [place].\par \par No changes in sense of smell.\par Writing is bigger.\par Her children think she speaks louder.\par No trouble swallowing.\par No constipation, she has a daily bowel movement.\par No trouble with urination.\par She has a history of acting out dreams and she remembers her dreams. She sleeps well otherwise.\par No history of hallucinations. \par No dizziness when standing.\par \par Family history: her mother used to shuffle, no one else with tremors\par Social history: lives alone, minimal exercise for balance and leg strengthening

## 2023-07-15 ENCOUNTER — RESULT REVIEW (OUTPATIENT)
Age: 83
End: 2023-07-15

## 2023-07-16 ENCOUNTER — TRANSCRIPTION ENCOUNTER (OUTPATIENT)
Age: 83
End: 2023-07-16

## 2023-07-17 ENCOUNTER — APPOINTMENT (OUTPATIENT)
Dept: FAMILY MEDICINE | Facility: CLINIC | Age: 83
End: 2023-07-17

## 2023-07-21 ENCOUNTER — APPOINTMENT (OUTPATIENT)
Dept: FAMILY MEDICINE | Facility: CLINIC | Age: 83
End: 2023-07-21

## 2023-08-13 PROBLEM — M81.0 AGE RELATED OSTEOPOROSIS: Status: ACTIVE | Noted: 2021-07-12

## 2023-08-13 NOTE — HISTORY OF PRESENT ILLNESS
[Sedentary] : a sedentary lifestyle [Previous Fragility Fracture] : previous fragility fracture(s) [Regular Dental Follow-Up] : regular dental follow-up [Uses a Walker/Cane] : use of a walker/cane [Loss of Height] : loss of height [FreeTextEntry1] : Ms. SANDER BLEVINS is a 83 year old female with Parkinson disease sent in a consult by [facility] for severe osteoporosis   on Vit D 1,000 IU qd Calcium does not know the dose once a day severe osteoporosis on DEXA reviewed done 7/21  had a fall 4yrs ago broke her arm  [Low Vit D Intake/Exposure] : no low vitamin D intake [Premat. Menopause (surgery)] : no history of premature surgical menopause [Amenorrhea] : no amenorrhea [Disordered Eating] : no history of disordered eating [Taking Steroids] : no history of taking steroids [Hyperparathyroidism] : no history of hyperparathyroidism [Diabetes] : no history of diabetes [Kidney Stones] : no history of kidney stones [Excessive Alcohol Intake] : no excessive alcohol intake [Excessive Soda] : no excessive soda [Current Smoking___(ppd)] : not currently smoking [Family History of Breast Cancer] : no family history of breast cancer [Family History of Hip Fracture] : no family history of hip fracture [Family History of Osteoporosis] : no family history of osteoporosis [History of Radiation Therapy] : no history of radiation therapy [History of Blood Clots] : no history of blood clots [High Fall Risk] : no fall risk [Frequent Falls] : no frequent falls [de-identified] : has denture

## 2023-08-13 NOTE — PHYSICAL EXAM
[Well Nourished] : well nourished [Alert] : alert [No Acute Distress] : no acute distress [Well Developed] : well developed [Normal Sclera/Conjunctiva] : normal sclera/conjunctiva [EOMI] : extra ocular movement intact [No Proptosis] : no proptosis [Normal Oropharynx] : the oropharynx was normal [Thyroid Not Enlarged] : the thyroid was not enlarged [No Thyroid Nodules] : no palpable thyroid nodules [No Respiratory Distress] : no respiratory distress [No Accessory Muscle Use] : no accessory muscle use [Clear to Auscultation] : lungs were clear to auscultation bilaterally [Normal S1, S2] : normal S1 and S2 [Normal Rate] : heart rate was normal [Regular Rhythm] : with a regular rhythm [No Edema] : no peripheral edema [Pedal Pulses Normal] : the pedal pulses are present [Normal Bowel Sounds] : normal bowel sounds [Not Tender] : non-tender [Not Distended] : not distended [Normal Anterior Cervical Nodes] : no anterior cervical lymphadenopathy [Soft] : abdomen soft [Normal Posterior Cervical Nodes] : no posterior cervical lymphadenopathy [No Spinal Tenderness] : no spinal tenderness [Spine Straight] : spine straight [No Stigmata of Cushings Syndrome] : no stigmata of Cushings Syndrome [Normal Gait] : normal gait [Normal Strength/Tone] : muscle strength and tone were normal [No Rash] : no rash [Acanthosis Nigricans] : no acanthosis nigricans [Normal Reflexes] : deep tendon reflexes were 2+ and symmetric [No Tremors] : no tremors [Oriented x3] : oriented to person, place, and time

## 2023-08-18 ENCOUNTER — TRANSCRIPTION ENCOUNTER (OUTPATIENT)
Age: 83
End: 2023-08-18

## 2023-08-20 ENCOUNTER — RESULT REVIEW (OUTPATIENT)
Age: 83
End: 2023-08-20

## 2023-08-21 ENCOUNTER — TRANSCRIPTION ENCOUNTER (OUTPATIENT)
Age: 83
End: 2023-08-21

## 2023-09-07 PROBLEM — E03.9 HYPOTHYROID: Status: ACTIVE | Noted: 2019-02-15

## 2023-09-07 PROBLEM — J44.9 COPD (CHRONIC OBSTRUCTIVE PULMONARY DISEASE): Status: ACTIVE | Noted: 2022-07-21

## 2023-09-07 PROBLEM — I25.118 CORONARY ARTERY DISEASE WITH STABLE ANGINA PECTORIS: Status: ACTIVE | Noted: 2021-07-22

## 2023-09-07 PROBLEM — E78.5 DYSLIPIDEMIA: Status: ACTIVE | Noted: 2020-07-02

## 2023-09-07 PROBLEM — I35.0 AORTIC STENOSIS: Status: ACTIVE | Noted: 2020-07-02

## 2023-09-07 PROBLEM — I10 HYPERTENSION: Status: ACTIVE | Noted: 2021-02-11

## 2023-09-07 NOTE — HISTORY OF PRESENT ILLNESS
[FreeTextEntry1] : 84 yo woman with  multiple medical issues, including:\par  1) Hx of lung cancer--s/p resection. Felt in remission\par  2) essential hypertension, on losartan and metoprolol tartrate 25\par  3) Hypothyroidism on levothyroxine\par  4) COPD\par  5) Hx of CAD\par  \par  The patient raised her family here in [place], retired to [place], but then came back to the area when her  passed away. She lives locally and has grown children and his sister.\par  \par  -In the past she was hospitalized for pneumonia and sepsis. She was found to have lung cancer and underwent successful lobectomy. The surgery was felt to be curative. \par  -In 2017 she had a non-Q-wave MI. \par  ----------------------------------------------------------------------------------------------------------------------------------\par  Cardiac catheterization on April 5, 2017 revealed:\par  -Only mild LAD disease.\par  -Severe stenosis of OM -1.\par  -Nonobstructive disease the right coronary artery.\par  --LV systolic function was normal.\par  She underwent successful stenting of the OM and has been clinically asymptomatic these last 3 years.\par  ----------------------------------------------------------------------------------------------------------------------------------\par  The patient was diagnosed as having dry macular degeneration in her left eye and 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 She is s/p  right vitrectomy on July 23, 2020.\par  \par  The patient is asymptomatic.\par  She does not experience angina at all. No shortness of breath or dyspnea on exertion.\par  No history of congestive heart failure. No orthopnea or PND. No peripheral edema.\par  She denies knowledge of a heart murmur or rheumatic fever.\par  \par  The patient smoked as a young adult but not in the last 40+ years\par  There is a history of alcohol abuse.\par  The patient is , lives at [place], and has adult children that live locally.\par  ====================================================================================\par  ***(Echocardiogram (March 28, 2023):\par  Normal LV size WITH MILD GLOBAL HYPOKINESIS  and EF 55%    \par  Mild  Conc LVH\par  Grade 1 diastolic dysfunction.\par  Mild pulmonary hypertension with RVSP  46.1\par  Mild left atrial enlargement. \par  ***Moderate aortic valve calcification with AS: \par  --Jan 2021:      mild to moderate aortic stenosis(36.6 /18.6 mean and aortic valve area 0.93 cm square)\par  -March 2023:    Moderate AS                              (27/16       TYLER 0.80   DVI 0.23)\par  Mitral valve thickening\par  ===============================================================================\par  Echo Jan 2021:                  AV: 35/18.    0.93 cm 2\par  Current ECho (3/27/2023): AV 27/26      0.80 cm2     DVI 0.23\par  \par  =========================================================================================\par  \par  Currently:  Had her two Covid shots and her neurologist increased propranolol LA from 60 to 80 mg daily, and who  added primidone\par  \par  EKG (Today)\par  Sinus  Rhythm \par  nl axis\par  LVH\par  Nonspecific ST-T changes persist, as before\par  NSC\par   with patient

## 2023-09-14 ENCOUNTER — APPOINTMENT (OUTPATIENT)
Dept: CARDIOLOGY | Facility: CLINIC | Age: 83
End: 2023-09-14

## 2023-09-14 DIAGNOSIS — J44.9 CHRONIC OBSTRUCTIVE PULMONARY DISEASE, UNSPECIFIED: ICD-10-CM

## 2023-09-14 DIAGNOSIS — I10 ESSENTIAL (PRIMARY) HYPERTENSION: ICD-10-CM

## 2023-09-14 DIAGNOSIS — I35.0 NONRHEUMATIC AORTIC (VALVE) STENOSIS: ICD-10-CM

## 2023-09-14 DIAGNOSIS — I25.118 ATHEROSCLEROTIC HEART DISEASE OF NATIVE CORONARY ARTERY WITH OTHER FORMS OF ANGINA PECTORIS: ICD-10-CM

## 2023-09-14 DIAGNOSIS — E03.9 HYPOTHYROIDISM, UNSPECIFIED: ICD-10-CM

## 2023-09-14 DIAGNOSIS — E78.5 HYPERLIPIDEMIA, UNSPECIFIED: ICD-10-CM

## 2023-09-18 ENCOUNTER — TRANSCRIPTION ENCOUNTER (OUTPATIENT)
Age: 83
End: 2023-09-18

## 2023-09-19 ENCOUNTER — NON-APPOINTMENT (OUTPATIENT)
Age: 83
End: 2023-09-19

## 2023-09-28 ENCOUNTER — RX RENEWAL (OUTPATIENT)
Age: 83
End: 2023-09-28

## 2023-10-02 ENCOUNTER — APPOINTMENT (OUTPATIENT)
Dept: SURGERY | Facility: CLINIC | Age: 83
End: 2023-10-02
Payer: MEDICARE

## 2023-10-02 VITALS
HEART RATE: 76 BPM | TEMPERATURE: 98.1 F | SYSTOLIC BLOOD PRESSURE: 104 MMHG | OXYGEN SATURATION: 95 % | WEIGHT: 124 LBS | BODY MASS INDEX: 20.91 KG/M2 | DIASTOLIC BLOOD PRESSURE: 46 MMHG | HEIGHT: 64.5 IN

## 2023-10-02 DIAGNOSIS — F51.01 PRIMARY INSOMNIA: ICD-10-CM

## 2023-10-02 DIAGNOSIS — K59.09 OTHER CONSTIPATION: ICD-10-CM

## 2023-10-02 DIAGNOSIS — K21.9 GASTRO-ESOPHAGEAL REFLUX DISEASE W/OUT ESOPHAGITIS: ICD-10-CM

## 2023-10-02 DIAGNOSIS — K31.1 ADULT HYPERTROPHIC PYLORIC STENOSIS: ICD-10-CM

## 2023-10-02 PROCEDURE — 99024 POSTOP FOLLOW-UP VISIT: CPT

## 2023-10-02 RX ORDER — LORATADINE 10 MG
17 TABLET,DISINTEGRATING ORAL
Refills: 0 | Status: ACTIVE | COMMUNITY

## 2023-10-02 RX ORDER — SODIUM CHLOR/HYPOCHLOROUS ACID 0.033 %
0.03 SOLUTION, IRRIGATION IRRIGATION
Refills: 0 | Status: ACTIVE | COMMUNITY

## 2023-10-02 RX ORDER — ATORVASTATIN CALCIUM 20 MG/1
20 TABLET, FILM COATED ORAL
Refills: 0 | Status: ACTIVE | COMMUNITY

## 2023-10-02 RX ORDER — LOSARTAN POTASSIUM 50 MG/1
50 TABLET, FILM COATED ORAL
Qty: 90 | Refills: 2 | Status: COMPLETED | COMMUNITY
Start: 2019-03-08 | End: 2023-10-02

## 2023-10-02 RX ORDER — ASPIRIN 81 MG
81 TABLET, DELAYED RELEASE (ENTERIC COATED) ORAL
Refills: 0 | Status: COMPLETED | COMMUNITY
End: 2023-10-02

## 2023-10-02 RX ORDER — DOCUSATE SODIUM 100 MG/1
100 CAPSULE, LIQUID FILLED ORAL
Refills: 0 | Status: ACTIVE | COMMUNITY

## 2023-10-02 RX ORDER — GLUCOSAMINE HCL/CHONDROITIN SU 500-400 MG
3 CAPSULE ORAL
Refills: 0 | Status: ACTIVE | COMMUNITY

## 2023-10-02 RX ORDER — FAMOTIDINE 20 MG/1
20 TABLET, FILM COATED ORAL
Refills: 0 | Status: ACTIVE | COMMUNITY

## 2023-10-02 RX ORDER — ESCITALOPRAM OXALATE 20 MG/1
20 TABLET ORAL
Qty: 90 | Refills: 0 | Status: COMPLETED | COMMUNITY
Start: 2019-05-10 | End: 2023-10-02

## 2023-10-02 RX ORDER — ATORVASTATIN CALCIUM 40 MG/1
40 TABLET, FILM COATED ORAL
Qty: 90 | Refills: 3 | Status: COMPLETED | COMMUNITY
Start: 2019-10-22 | End: 2023-10-02

## 2023-10-02 RX ORDER — FLUTICASONE FUROATE, UMECLIDINIUM BROMIDE AND VILANTEROL TRIFENATATE 200; 62.5; 25 UG/1; UG/1; UG/1
200-62.5-25 POWDER RESPIRATORY (INHALATION)
Refills: 0 | Status: COMPLETED | COMMUNITY
Start: 2022-10-22 | End: 2023-10-02

## 2023-10-02 RX ORDER — BUDESONIDE AND FORMOTEROL FUMARATE DIHYDRATE 160; 4.5 UG/1; UG/1
160-4.5 AEROSOL RESPIRATORY (INHALATION)
Refills: 0 | Status: ACTIVE | COMMUNITY

## 2023-10-02 RX ORDER — ZINC OXIDE AND DIMETHICONE 120; 10 MG/G; MG/G
12 CREAM TOPICAL
Refills: 0 | Status: ACTIVE | COMMUNITY

## 2023-10-02 RX ORDER — CALCIUM CARBONATE/VITAMIN D3 600 MG-10
600-400 TABLET ORAL
Refills: 0 | Status: ACTIVE | COMMUNITY

## 2023-10-02 RX ORDER — PROPRANOLOL HYDROCHLORIDE 80 MG/1
80 CAPSULE, EXTENDED RELEASE ORAL
Qty: 90 | Refills: 3 | Status: COMPLETED | COMMUNITY
Start: 2021-11-18 | End: 2023-10-02

## 2023-10-02 RX ORDER — SENNOSIDES 8.6 MG TABLETS 8.6 MG/1
8.6 TABLET ORAL
Refills: 0 | Status: ACTIVE | COMMUNITY

## 2023-10-02 RX ORDER — LEVOTHYROXINE SODIUM 0.09 MG/1
88 TABLET ORAL DAILY
Qty: 90 | Refills: 1 | Status: COMPLETED | COMMUNITY
Start: 2019-06-10 | End: 2023-10-02

## 2023-10-02 RX ORDER — METOPROLOL TARTRATE 25 MG/1
25 TABLET, FILM COATED ORAL
Refills: 0 | Status: ACTIVE | COMMUNITY

## 2023-10-02 RX ORDER — LEVOTHYROXINE SODIUM 75 UG/1
75 CAPSULE ORAL
Refills: 0 | Status: ACTIVE | COMMUNITY

## 2023-10-02 RX ORDER — IPRATROPIUM BROMIDE AND ALBUTEROL 20; 100 UG/1; UG/1
20-100 SPRAY, METERED RESPIRATORY (INHALATION)
Refills: 0 | Status: ACTIVE | COMMUNITY

## 2023-10-02 RX ORDER — ENOXAPARIN SODIUM 40 MG/.4ML
40 INJECTION SUBCUTANEOUS
Refills: 0 | Status: ACTIVE | COMMUNITY

## 2023-10-02 RX ORDER — MELOXICAM 15 MG/1
15 TABLET ORAL
Qty: 30 | Refills: 0 | Status: COMPLETED | COMMUNITY
Start: 2023-06-06 | End: 2023-10-02

## 2023-10-19 NOTE — END OF VISIT
Belongings secured in bag [id number] along with aonther pt large white bag that would not fit into security bag, pt label placed on bag   [Time Spent: ___ minutes] : I have spent [unfilled] minutes of time on the encounter.

## 2023-11-01 ENCOUNTER — APPOINTMENT (OUTPATIENT)
Dept: SURGERY | Facility: CLINIC | Age: 83
End: 2023-11-01
Payer: MEDICARE

## 2023-11-01 VITALS
DIASTOLIC BLOOD PRESSURE: 53 MMHG | SYSTOLIC BLOOD PRESSURE: 118 MMHG | TEMPERATURE: 98.5 F | OXYGEN SATURATION: 96 % | HEART RATE: 95 BPM

## 2023-11-01 PROCEDURE — 99024 POSTOP FOLLOW-UP VISIT: CPT

## 2023-12-20 ENCOUNTER — APPOINTMENT (OUTPATIENT)
Dept: NEUROLOGY | Facility: CLINIC | Age: 83
End: 2023-12-20
Payer: MEDICARE

## 2023-12-20 VITALS
HEIGHT: 64 IN | HEART RATE: 99 BPM | DIASTOLIC BLOOD PRESSURE: 63 MMHG | WEIGHT: 124 LBS | BODY MASS INDEX: 21.17 KG/M2 | SYSTOLIC BLOOD PRESSURE: 123 MMHG | OXYGEN SATURATION: 95 %

## 2023-12-20 DIAGNOSIS — G25.0 ESSENTIAL TREMOR: ICD-10-CM

## 2023-12-20 PROCEDURE — 99214 OFFICE O/P EST MOD 30 MIN: CPT

## 2023-12-20 NOTE — PHYSICAL EXAM
[Person] : oriented to person [Place] : oriented to place [Time] : oriented to time [Comprehension] : comprehension intact [Cranial Nerves Optic (II)] : visual acuity intact bilaterally,  visual fields full to confrontation, pupils equal round and reactive to light [Cranial Nerves Oculomotor (III)] : extraocular motion intact [Cranial Nerves Trigeminal (V)] : facial sensation intact symmetrically [Cranial Nerves Facial (VII)] : face symmetrical [Cranial Nerves Vestibulocochlear (VIII)] : hearing was intact bilaterally [Cranial Nerves Glossopharyngeal (IX)] : tongue and palate midline [Cranial Nerves Accessory (XI - Cranial And Spinal)] : head turning and shoulder shrug symmetric [Cranial Nerves Hypoglossal (XII)] : there was no tongue deviation with protrusion [Motor Strength] : muscle strength was normal in all four extremities [Sensation Tactile Decrease] : light touch was intact [1+] : Ankle jerk left 1+ [Concentration Intact] : a decrease in concentrating ability was observed [Paresis Pronator Drift Right-Sided] : no pronator drift on the right [Paresis Pronator Drift Left-Sided] : no pronator drift on the left [Coordination - Dysmetria Impaired Finger-to-Nose Bilateral] : not present [FreeTextEntry1] : Face is not significantly masked. EOMI, normal saccades. Voice is with good volume, mild vocal tremor. Mild jaw tremor. Mild-moderate resting tremors of the left hand more than right. Mild-moderate postural tremors of the left hand. Mild-moderate action tremors, left more than right. No leg tremors. Mild bradykinesia on the left. No significant rigidity. Pushes to stand. Posture is mildly stooped.  Gait is moderately wide based, shortened stride length, no shuffling, multistep turns. Steady with rollator. Postural reflexes are intact.

## 2023-12-20 NOTE — HISTORY OF PRESENT ILLNESS
[FreeTextEntry1] : Sharon Teresa is an 83 year old  Female with a PMHx of lung cancer, s/p MI, s/p lung resection on the right ~6 years ago, COPD, HLD, Depression, Hypothyroidism, HTN who presents for follow up in the Movement Disorders Clinic at [facility] for Essential Tremors.    Interval history: Since the last visit, she reports she has had ongoing stomach pain and had imaging done today. She reports her tremors are better some days than others. No worsening of her tremors.   Current meds: Metoprolol Primidone 200mg at bedtime    Prior meds: Propranolol 60mg daily  Initial history: She has had a left hand tremor for years. Her medication was changed to propranolol about a month ago, it has helped. Less tremor in the right hand. The tremors interfere with writing. No tremors in the head.   Buttons are challenging. No trouble tying shoelaces or cutting food. No trouble turning or adjusting in bed at night. She walks with a cane. She is afraid of falling. She shuffles when walking. She has fallen in the past, last was 1.5 years ago. Once she fell stepping off a curb and broke her shoulder on the left. She fell out of bed when she was living in [place].  No changes in sense of smell. Writing is bigger. Her children think she speaks louder. No trouble swallowing. No constipation, she has a daily bowel movement. No trouble with urination. She has a history of acting out dreams and she remembers her dreams. She sleeps well otherwise. No history of hallucinations.  No dizziness when standing.  Family history: her mother used to shuffle, no one else with tremors Social history: lives alone, minimal exercise for balance and leg strengthening

## 2023-12-20 NOTE — DISCUSSION/SUMMARY
[FreeTextEntry1] : Sharon Teresa is an 83 year old  Female with a PMHx of lung cancer, s/p MI, s/p lung resection on the right ~6 years ago, COPD, HLD, Depression, Hypothyroidism, HTN who presents for follow up in the Movement Disorders Clinic at [facility] for Essential Tremors.    The patient's history is consistent with Essential tremor, likely Essential tremor plus with mild parkinsonian features on examination that are stable. The patient reports that she has had a good response to propranolol with regard to her tremors, but cannot titrate higher due to dizziness. She was switched to metoprolol based on her recent medication list. Since adding Primidone 150mg at bedtime, she has noticed improvement in her tremors, but still with significant difficulty, despite increasing to 200mg at bedtime. Her gait and balance are her main issue, likely multifactorial. ET patients can also have balance issues. Discussed the benefits and side effects of increasing medications.  Plan: - Would recommend resuming Propranolol at an appropriate dose for her BP - Continue Primidone 200mg at bedtime  - Discussed potential PD medications for further tremor control but currently she has GI issues and new medications are not recommended until this is solved - Discussed HiFU as an option again - Encouraged to increase exercise and physical activity and maintain an active social and intellectual life.  Patient should follow up with Movement Disorders  All questions were answered to her satisfaction. I spent 30 minutes with this patient.

## 2024-01-08 ENCOUNTER — APPOINTMENT (OUTPATIENT)
Dept: SURGERY | Facility: CLINIC | Age: 84
End: 2024-01-08
Payer: MEDICARE

## 2024-01-08 VITALS
SYSTOLIC BLOOD PRESSURE: 147 MMHG | OXYGEN SATURATION: 95 % | HEART RATE: 81 BPM | DIASTOLIC BLOOD PRESSURE: 46 MMHG | TEMPERATURE: 97.8 F

## 2024-01-08 DIAGNOSIS — R93.5 ABNORMAL FINDINGS ON DIAGNOSTIC IMAGING OF OTHER ABDOMINAL REGIONS, INCLUDING RETROPERITONEUM: ICD-10-CM

## 2024-01-08 PROCEDURE — 99213 OFFICE O/P EST LOW 20 MIN: CPT

## 2024-01-08 NOTE — PLAN
[FreeTextEntry1] : Patient is now 4 months out from subtotal gastrectomy with Shyanne-en-Y reconstruction after initial partial gastrectomy for peptic ulcer disease.  She has essentially remained well and is living in a rehab center.  She had some intermittent episodes of abdominal pain and recently got an MRI on on 1228.  This was done in order to look for a DVT.  She has a excluded loop of small bowel that was left in her pelvis after an extensive lysis of adhesions that was left at the time of surgery.  This was noted on the MRI and it now measures 4 cm in diameter with some suggestions of inflammation.  On exam her abdomen is soft and nontender.  Her incision is well-healed.  The upper portion of her wound has attenuated fascia but no obvious defect.  Plan: The 4 cm excluded loop of small bowel is an expected postsurgical change.  There is no required surgical intervention.  I do not believe it is the source of her recent pain which is intermittent.  Likely it is diet related as she is at a new facility and no new food.  She will follow me on an as-needed basis.

## 2024-01-31 ENCOUNTER — APPOINTMENT (OUTPATIENT)
Dept: ENDOCRINOLOGY | Facility: CLINIC | Age: 84
End: 2024-01-31

## 2024-09-16 ENCOUNTER — TRANSCRIPTION ENCOUNTER (OUTPATIENT)
Age: 84
End: 2024-09-16

## 2024-09-23 ENCOUNTER — TRANSCRIPTION ENCOUNTER (OUTPATIENT)
Age: 84
End: 2024-09-23

## 2024-10-10 ENCOUNTER — NON-APPOINTMENT (OUTPATIENT)
Age: 84
End: 2024-10-10

## 2024-10-10 ENCOUNTER — APPOINTMENT (OUTPATIENT)
Dept: GASTROENTEROLOGY | Facility: CLINIC | Age: 84
End: 2024-10-10
Payer: MEDICARE

## 2024-10-10 VITALS
BODY MASS INDEX: 18.27 KG/M2 | DIASTOLIC BLOOD PRESSURE: 58 MMHG | WEIGHT: 107 LBS | SYSTOLIC BLOOD PRESSURE: 100 MMHG | HEART RATE: 79 BPM | HEIGHT: 64 IN | OXYGEN SATURATION: 96 %

## 2024-10-10 DIAGNOSIS — K59.09 OTHER CONSTIPATION: ICD-10-CM

## 2024-10-10 PROCEDURE — 99214 OFFICE O/P EST MOD 30 MIN: CPT

## 2025-01-03 ENCOUNTER — TRANSCRIPTION ENCOUNTER (OUTPATIENT)
Age: 85
End: 2025-01-03